# Patient Record
Sex: MALE | Race: WHITE | NOT HISPANIC OR LATINO | URBAN - METROPOLITAN AREA
[De-identification: names, ages, dates, MRNs, and addresses within clinical notes are randomized per-mention and may not be internally consistent; named-entity substitution may affect disease eponyms.]

---

## 2021-02-10 ENCOUNTER — EMERGENCY (EMERGENCY)
Facility: HOSPITAL | Age: 56
LOS: 1 days | Discharge: HOME | End: 2021-02-10
Attending: STUDENT IN AN ORGANIZED HEALTH CARE EDUCATION/TRAINING PROGRAM | Admitting: STUDENT IN AN ORGANIZED HEALTH CARE EDUCATION/TRAINING PROGRAM
Payer: COMMERCIAL

## 2021-02-10 VITALS
HEIGHT: 65 IN | HEART RATE: 107 BPM | OXYGEN SATURATION: 94 % | TEMPERATURE: 98 F | WEIGHT: 179.02 LBS | DIASTOLIC BLOOD PRESSURE: 118 MMHG | RESPIRATION RATE: 17 BRPM | SYSTOLIC BLOOD PRESSURE: 162 MMHG

## 2021-02-10 VITALS
HEART RATE: 88 BPM | OXYGEN SATURATION: 96 % | SYSTOLIC BLOOD PRESSURE: 177 MMHG | RESPIRATION RATE: 16 BRPM | TEMPERATURE: 98 F | DIASTOLIC BLOOD PRESSURE: 100 MMHG

## 2021-02-10 LAB
ALBUMIN SERPL ELPH-MCNC: 4 G/DL — SIGNIFICANT CHANGE UP (ref 3.3–5)
ALP SERPL-CCNC: 84 U/L — SIGNIFICANT CHANGE UP (ref 40–120)
ALT FLD-CCNC: 86 U/L — HIGH (ref 12–78)
ANION GAP SERPL CALC-SCNC: 10 MMOL/L — SIGNIFICANT CHANGE UP (ref 5–17)
APTT BLD: 35.7 SEC — HIGH (ref 27.5–35.5)
AST SERPL-CCNC: 45 U/L — HIGH (ref 15–37)
BASOPHILS # BLD AUTO: 0.05 K/UL — SIGNIFICANT CHANGE UP (ref 0–0.2)
BASOPHILS NFR BLD AUTO: 0.9 % — SIGNIFICANT CHANGE UP (ref 0–2)
BILIRUB SERPL-MCNC: 0.3 MG/DL — SIGNIFICANT CHANGE UP (ref 0.2–1.2)
BLD GP AB SCN SERPL QL: SIGNIFICANT CHANGE UP
BUN SERPL-MCNC: 15 MG/DL — SIGNIFICANT CHANGE UP (ref 7–23)
CALCIUM SERPL-MCNC: 8.8 MG/DL — SIGNIFICANT CHANGE UP (ref 8.5–10.1)
CHLORIDE SERPL-SCNC: 106 MMOL/L — SIGNIFICANT CHANGE UP (ref 96–108)
CO2 SERPL-SCNC: 23 MMOL/L — SIGNIFICANT CHANGE UP (ref 22–31)
CREAT SERPL-MCNC: 1 MG/DL — SIGNIFICANT CHANGE UP (ref 0.5–1.3)
EOSINOPHIL # BLD AUTO: 0.1 K/UL — SIGNIFICANT CHANGE UP (ref 0–0.5)
EOSINOPHIL NFR BLD AUTO: 1.8 % — SIGNIFICANT CHANGE UP (ref 0–6)
GLUCOSE SERPL-MCNC: 111 MG/DL — HIGH (ref 70–99)
HCT VFR BLD CALC: 48.6 % — SIGNIFICANT CHANGE UP (ref 39–50)
HGB BLD-MCNC: 16.7 G/DL — SIGNIFICANT CHANGE UP (ref 13–17)
IMM GRANULOCYTES NFR BLD AUTO: 0.2 % — SIGNIFICANT CHANGE UP (ref 0–1.5)
INR BLD: 0.97 RATIO — SIGNIFICANT CHANGE UP (ref 0.88–1.16)
LYMPHOCYTES # BLD AUTO: 1.59 K/UL — SIGNIFICANT CHANGE UP (ref 1–3.3)
LYMPHOCYTES # BLD AUTO: 28.9 % — SIGNIFICANT CHANGE UP (ref 13–44)
MCHC RBC-ENTMCNC: 30.9 PG — SIGNIFICANT CHANGE UP (ref 27–34)
MCHC RBC-ENTMCNC: 34.4 GM/DL — SIGNIFICANT CHANGE UP (ref 32–36)
MCV RBC AUTO: 89.8 FL — SIGNIFICANT CHANGE UP (ref 80–100)
MONOCYTES # BLD AUTO: 0.64 K/UL — SIGNIFICANT CHANGE UP (ref 0–0.9)
MONOCYTES NFR BLD AUTO: 11.6 % — SIGNIFICANT CHANGE UP (ref 2–14)
NEUTROPHILS # BLD AUTO: 3.11 K/UL — SIGNIFICANT CHANGE UP (ref 1.8–7.4)
NEUTROPHILS NFR BLD AUTO: 56.6 % — SIGNIFICANT CHANGE UP (ref 43–77)
NRBC # BLD: 0 /100 WBCS — SIGNIFICANT CHANGE UP (ref 0–0)
PLATELET # BLD AUTO: 282 K/UL — SIGNIFICANT CHANGE UP (ref 150–400)
POTASSIUM SERPL-MCNC: 3.7 MMOL/L — SIGNIFICANT CHANGE UP (ref 3.5–5.3)
POTASSIUM SERPL-SCNC: 3.7 MMOL/L — SIGNIFICANT CHANGE UP (ref 3.5–5.3)
PROT SERPL-MCNC: 8.2 G/DL — SIGNIFICANT CHANGE UP (ref 6–8.3)
PROTHROM AB SERPL-ACNC: 11.4 SEC — SIGNIFICANT CHANGE UP (ref 10.6–13.6)
RBC # BLD: 5.41 M/UL — SIGNIFICANT CHANGE UP (ref 4.2–5.8)
RBC # FLD: 11.8 % — SIGNIFICANT CHANGE UP (ref 10.3–14.5)
SODIUM SERPL-SCNC: 139 MMOL/L — SIGNIFICANT CHANGE UP (ref 135–145)
WBC # BLD: 5.5 K/UL — SIGNIFICANT CHANGE UP (ref 3.8–10.5)
WBC # FLD AUTO: 5.5 K/UL — SIGNIFICANT CHANGE UP (ref 3.8–10.5)

## 2021-02-10 PROCEDURE — 72125 CT NECK SPINE W/O DYE: CPT

## 2021-02-10 PROCEDURE — 36415 COLL VENOUS BLD VENIPUNCTURE: CPT

## 2021-02-10 PROCEDURE — 72125 CT NECK SPINE W/O DYE: CPT | Mod: 26,MA

## 2021-02-10 PROCEDURE — 86900 BLOOD TYPING SEROLOGIC ABO: CPT

## 2021-02-10 PROCEDURE — 73110 X-RAY EXAM OF WRIST: CPT

## 2021-02-10 PROCEDURE — 71045 X-RAY EXAM CHEST 1 VIEW: CPT

## 2021-02-10 PROCEDURE — 86901 BLOOD TYPING SEROLOGIC RH(D): CPT

## 2021-02-10 PROCEDURE — 73110 X-RAY EXAM OF WRIST: CPT | Mod: 26,LT

## 2021-02-10 PROCEDURE — 70450 CT HEAD/BRAIN W/O DYE: CPT

## 2021-02-10 PROCEDURE — 85025 COMPLETE CBC W/AUTO DIFF WBC: CPT

## 2021-02-10 PROCEDURE — 74177 CT ABD & PELVIS W/CONTRAST: CPT

## 2021-02-10 PROCEDURE — 25622 CLTX CARPL SCPHD FX W/O MNPJ: CPT | Mod: 54

## 2021-02-10 PROCEDURE — 74177 CT ABD & PELVIS W/CONTRAST: CPT | Mod: 26,MA

## 2021-02-10 PROCEDURE — 99285 EMERGENCY DEPT VISIT HI MDM: CPT | Mod: 57

## 2021-02-10 PROCEDURE — 99284 EMERGENCY DEPT VISIT MOD MDM: CPT | Mod: 25

## 2021-02-10 PROCEDURE — 70450 CT HEAD/BRAIN W/O DYE: CPT | Mod: 26,MA

## 2021-02-10 PROCEDURE — 71045 X-RAY EXAM CHEST 1 VIEW: CPT | Mod: 26

## 2021-02-10 PROCEDURE — 85730 THROMBOPLASTIN TIME PARTIAL: CPT

## 2021-02-10 PROCEDURE — 29125 APPL SHORT ARM SPLINT STATIC: CPT | Mod: LT

## 2021-02-10 PROCEDURE — 80053 COMPREHEN METABOLIC PANEL: CPT

## 2021-02-10 PROCEDURE — 86850 RBC ANTIBODY SCREEN: CPT

## 2021-02-10 PROCEDURE — 85610 PROTHROMBIN TIME: CPT

## 2021-02-10 RX ORDER — ACETAMINOPHEN 500 MG
975 TABLET ORAL ONCE
Refills: 0 | Status: COMPLETED | OUTPATIENT
Start: 2021-02-10 | End: 2021-02-10

## 2021-02-10 RX ADMIN — Medication 975 MILLIGRAM(S): at 12:46

## 2021-02-10 NOTE — ED PROVIDER NOTE - NS ED ROS FT
Constitutional: No fever.  Neurological: +headache.  Eyes: No vision changes.   Ears, Nose, Mouth, Throat: No congestion.  Cardiovascular: No chest pain.  Respiratory: No difficulty breathing.  Gastrointestinal: No nausea or vomiting. + abdominal pain.  Genitourinary: No dysuria.  Musculoskeletal: + wrist pain.  Integumentary (skin and/or breast): No rash.

## 2021-02-10 NOTE — ED PROVIDER NOTE - CARE PROVIDER_API CALL
Claudia García)  Orthopaedic Surgery Surgery  [address] [place] [address]  Phone: [phone]  Fax: [phone]  Follow Up Time: 4-6 Days

## 2021-02-10 NOTE — ED PROVIDER NOTE - PHYSICAL EXAMINATION
Vital signs as available reviewed.  General:  Comfortable, no acute distress.  Head: Mild erythema and  tenderness to palpation behind left ear without crepitus / step off.  Eyes:  Conjunctiva pink, no icterus.  Cardiovascular:  Regular rate, no obvious murmur.  Respiratory:  Clear to auscultation, good air entry bilaterally.  Abdomen:  Soft, + left mid abdominal pain.  Musculoskeletal:  left wrist swelling and erythema with snuff box tenderness.  Neurologic: Alert and oriented, moving all extremities.  Skin:  Warm and dry.

## 2021-02-10 NOTE — ED PROVIDER NOTE - CARE PLAN
Principal Discharge DX:	Scaphoid fracture, wrist, closed  Secondary Diagnosis:	Closed head injury  Secondary Diagnosis:	Fall

## 2021-02-10 NOTE — ED PROVIDER NOTE - OBJECTIVE STATEMENT
trip and fall, hit back of head, fell on left wrist. hit stomach on metal cabinet. + headache, + abdominal pain + left wrist pain.    left wrist swelling and erythema, tenderness to palpation behing left ear. 55 M history of brain aneurysm here s/p trip and fall at work. He report he fell and hit the back of his head, and tried to catch himself with left arm and also hit stomach on metal cabinet. + headache, + abdominal pain + left wrist pain. no loss of consciousness. no vomiting.

## 2021-02-10 NOTE — ED PROVIDER NOTE - PATIENT PORTAL LINK FT
You can access the FollowMyHealth Patient Portal offered by [facility] by registering at the following website: [email or web address]. By joining [facility]’s FollowMyHealth portal, you will also be able to view your health information using other applications (apps) compatible with our system.

## 2021-02-10 NOTE — ED PROVIDER NOTE - NSFOLLOWUPINSTRUCTIONS_ED_ALL_ED_FT
Wrist Injury    WHAT YOU NEED TO KNOW:    A wrist injury is damage to the tissues of your wrist joint. Examples are a fracture, sprain (stretched or torn ligament), or strain (stretched or torn tendon).    DISCHARGE INSTRUCTIONS:    Return to the emergency department if:   •The skin on or near your wrist or hand feels cold or turns blue or white.      •The skin on or near your wrist or hand is tight, raised, and swollen.      •You have new trouble moving and using your hands, fingers, or wrist.      •Your wrist, hands, or fingers become swollen, red, numb, or tingle.      •You have any open wounds that are red, swollen, warm, or have pus coming from them.      Call your doctor if:   •You have a fever.      •The bruising, swelling, or pain in your wrist gets worse.      •You have questions or concerns about your condition or care.      Medicines: You may need any of the following:   •NSAIDs, such as ibuprofen, help decrease swelling, pain, and fever. NSAIDs can cause stomach bleeding or kidney problems in certain people. If you take blood thinner medicine, always ask your healthcare provider if NSAIDs are safe for you. Always read the medicine label and follow directions.      •Prescription pain medicine may be given. Ask your healthcare provider how to take this medicine safely. Some prescription pain medicines contain acetaminophen. Do not take other medicines that contain acetaminophen without talking to your healthcare provider. Too much acetaminophen may cause liver damage. Prescription pain medicine may cause constipation. Ask your healthcare provider how to prevent or treat constipation.       •Take your medicine as directed. Contact your healthcare provider if you think your medicine is not helping or if you have side effects. Tell him or her if you are allergic to any medicine. Keep a list of the medicines, vitamins, and herbs you take. Include the amounts, and when and why you take them. Bring the list or the pill bottles to follow-up visits. Carry your medicine list with you in case of an emergency.      Manage your symptoms:   •Rest your wrist for 48 hours, or as directed. Avoid activities that cause pain. Ask which activities you should avoid and for how long. Ask when you may return to your regular physical activities or sports. If you start to use your wrist too soon, you may injure it wrist again.      •Put ice on your wrist to decrease pain and swelling. Use an ice pack, or put crushed ice in a plastic bag. Wrap a towel around the bag before you put it on your wrist. Apply ice for 15 minutes every hour, or as directed.      •Apply compression by wrapping your wrist with an elastic bandage. This will help decrease swelling, support your wrist, and help it heal. Wear your wrist wrap as directed. The bandage should be snug but not so tight that your fingers are numb or tingly.      •Elevate your wrist above the level of your heart when you sit or lie down. Prop your arm and hand on pillows to keep your wrist elevated comfortably.      •Go to physical therapy, if directed. A physical therapist can teach you exercises to strengthen your wrist and improve the range of movement. These exercises may also help decrease your pain.      Prevent another wrist injury:   •Do strengthening exercises. Your healthcare provider or physical therapist may suggest that you do exercises to strengthen your hand and arm muscles. He or she will tell you when to start doing these exercises and how long to continue.      •Protect your wrists. Wrist guard splints or protective tape can help support your wrist during exercise and sports. These devices may also keep your wrist from bending too far back. Ask for more information about the type of wrist support that you should use.      Follow up with your doctor as directed: Write down your questions so you remember to ask them during your visits

## 2021-02-10 NOTE — ED ADULT NURSE NOTE - OBJECTIVE STATEMENT
55 male alert and orientate to ed c/o slip and fall at states abdominal, head and wrist pain.  V/S WNL   PEERL   neg resp comp  L/S Clear x 3   neg abd   neg neuro   no LOC  pt c/o 7/10 pain attended to by Dr Gomez     Tylenol 925 mg po given.  pt to CT and X ray

## 2021-02-10 NOTE — ED ADULT NURSE NOTE - PAIN RATING/NUMBER SCALE (0-10): ACTIVITY
Quality 226: Preventive Care And Screening: Tobacco Use: Screening And Cessation Intervention: Patient screened for tobacco use and is an ex/non-smoker 7

## 2021-02-10 NOTE — ED PROVIDER NOTE - NS ED MD DISPO DISCHARGE CCDA
Patient/Caregiver provided printed discharge information. I, Bib Ashley, performed a face to face bedside interview with this patient regarding history of present illness, review of symptoms and relevant past medical, social and family history.  I completed an independent physical examination. I have communicated the patient’s plan of care and disposition with the ACP.          32 yo M Pt, presents complaining of L ear pain x 2 days. PT states he developed ear pain that hurts when he touches his ear. PT admits to swimming 2 days ago. PT also tried to clean out ears with peroxide and no help; pe left ear pain with tugging on ear;  mild swelling of external canal;   dx otitis externa tx otitic abx and pain meds

## 2022-08-01 PROBLEM — Z00.00 ENCOUNTER FOR PREVENTIVE HEALTH EXAMINATION: Status: ACTIVE | Noted: 2022-08-01

## 2022-08-01 PROBLEM — I72.9 ANEURYSM OF UNSPECIFIED SITE: Chronic | Status: ACTIVE | Noted: 2021-02-10

## 2022-08-01 PROBLEM — F41.9 ANXIETY DISORDER, UNSPECIFIED: Chronic | Status: ACTIVE | Noted: 2021-02-10

## 2022-08-04 ENCOUNTER — APPOINTMENT (OUTPATIENT)
Dept: ORTHOPEDIC SURGERY | Facility: CLINIC | Age: 57
End: 2022-08-04
Payer: OTHER MISCELLANEOUS

## 2022-08-04 DIAGNOSIS — S73.102A UNSPECIFIED SPRAIN OF LEFT HIP, INITIAL ENCOUNTER: ICD-10-CM

## 2022-08-04 PROCEDURE — 73503 X-RAY EXAM HIP UNI 4/> VIEWS: CPT | Mod: LT

## 2022-08-04 PROCEDURE — 99072 ADDL SUPL MATRL&STAF TM PHE: CPT

## 2022-08-04 PROCEDURE — 99203 OFFICE O/P NEW LOW 30 MIN: CPT

## 2022-08-04 RX ORDER — METHYLPREDNISOLONE 4 MG/1
4 TABLET ORAL
Qty: 1 | Refills: 1 | Status: ACTIVE | COMMUNITY
Start: 2022-08-04 | End: 1900-01-01

## 2022-08-04 NOTE — PHYSICAL EXAM
[Normal Mood and Affect] : normal mood and affect [Able to Communicate] : able to communicate [Well Developed] : well developed [Well Nourished] : well nourished [NL (120)] : flexion 120 degrees [NL (35)] : adduction 35 degrees [NL (45)] : external rotation 45 degrees [5___] : abduction 5[unfilled]/5 [Left] : left hip with pelvis [AP] : anteroposterior [Lateral] : lateral [Moderate arthritis (Tonnis Grade 2)] : Moderate arthritis (Tonnis Grade 2) [] : no pain with log-rolling of hip [FreeTextEntry9] : Moderate OA lt hip and mild OA rt hip [TWNoteComboBox6] : internal rotation 30 degrees

## 2022-08-04 NOTE — WORK
[Sprain/Strain] : sprain/strain [Was the competent medical cause of the injury] : was the competent medical cause of the injury [Are consistent with the injury] : are consistent with the injury [Consistent with my objective findings] : consistent with my objective findings [Total] : total [Does not reveal pre-existing condition(s) that may affect treatment/prognosis] : does not reveal pre-existing condition(s) that may affect treatment/prognosis [Cannot return to work because ________] : cannot return to work because [unfilled] [FreeTextEntry1] : Uncertain

## 2022-08-04 NOTE — HISTORY OF PRESENT ILLNESS
[de-identified] : WC Case  \par \par 8/4/22   Initial visit for this 57 year male who getting out of a c ar at work on 8/2/22 when slipped and almost did a split, injuring lt hip. C/o lt groin pain since. Limping. Difficulty wtbearing. Taking no nsaids. OOW since then.\par \par PMH: had a similar injury x 3 months ago lifting heavy fence material when he first noticed lt groin pain. Slowly improved wher he could walk w/o pain but couldn't run. [FreeTextEntry1] : right elbow

## 2022-08-05 ENCOUNTER — APPOINTMENT (OUTPATIENT)
Dept: ORTHOPEDIC SURGERY | Facility: CLINIC | Age: 57
End: 2022-08-05

## 2022-08-05 VITALS — BODY MASS INDEX: 30.82 KG/M2 | HEIGHT: 65 IN | WEIGHT: 185 LBS

## 2022-08-05 DIAGNOSIS — M77.01 MEDIAL EPICONDYLITIS, RIGHT ELBOW: ICD-10-CM

## 2022-08-05 PROCEDURE — 73070 X-RAY EXAM OF ELBOW: CPT | Mod: RT

## 2022-08-05 PROCEDURE — 99214 OFFICE O/P EST MOD 30 MIN: CPT | Mod: 25

## 2022-08-05 PROCEDURE — 20551 NJX 1 TENDON ORIGIN/INSJ: CPT

## 2022-08-05 NOTE — HISTORY OF PRESENT ILLNESS
[8] : 8 [6] : 6 [Constant] : constant [Rest] : rest [Full time] : Work status: full time [de-identified] : 8/5/22  Return visit for this 57 year male RHD who lifted a heavy object x 4 weeks ago and noticed pain rt elbow since. Constant and daily. Worse lifting and gripping. taking no nsaids.\par \par PMH: No prior issues [] : no [FreeTextEntry1] : right elbow [FreeTextEntry9] : advil

## 2022-08-05 NOTE — PHYSICAL EXAM
[Normal Mood and Affect] : normal mood and affect [Orientated] : orientated [Able to Communicate] : able to communicate [Well Developed] : well developed [Well Nourished] : well nourished [NL (150)] : flexion 150 degrees [NL (0)] : extension 0 degrees [NL (90)] : supination 90 degrees [5___] : supination 5[unfilled]/5 [] : no tenderness over lateral epicondyle [Right] : right elbow [There are no fractures, subluxations or dislocations. No significant abnormalities are seen] : There are no fractures, subluxations or dislocations. No significant abnormalities are seen [FreeTextEntry1] : ?? slight DJD proximal radio-ulnar joint

## 2022-08-05 NOTE — PROCEDURE
[Tendon Origin] : tendon origin [Right] : of the right [Medial epicondyle] : medial epicondyle [Pain] : pain [Inflammation] : inflammation [Alcohol] : alcohol [___ cc    1%] : Lidocaine ~Vcc of 1%  [___ cc    40mg] : Methylprednisolone (Depomedrol) ~Vcc of 40 mg  [] : Patient tolerated procedure well [Call if redness, pain or fever occur] : call if redness, pain or fever occur [Apply ice for 15min out of every hour for the next 12-24 hours as tolerated] : apply ice for 15 minutes out of every hour for the next 12-24 hours as tolerated [Risks, benefits, alternatives discussed / Verbal consent obtained] : the risks benefits, and alternatives have been discussed, and verbal consent was obtained

## 2022-08-22 ENCOUNTER — APPOINTMENT (OUTPATIENT)
Dept: ORTHOPEDIC SURGERY | Facility: CLINIC | Age: 57
End: 2022-08-22

## 2022-08-22 PROCEDURE — 99072 ADDL SUPL MATRL&STAF TM PHE: CPT

## 2022-08-22 PROCEDURE — 99213 OFFICE O/P EST LOW 20 MIN: CPT

## 2022-08-22 RX ORDER — PIROXICAM 20 MG/1
20 CAPSULE ORAL
Qty: 30 | Refills: 5 | Status: ACTIVE | COMMUNITY
Start: 2022-08-22 | End: 1900-01-01

## 2022-08-22 NOTE — REVIEW OF SYSTEMS
[Joint Pain] : joint pain [Joint Stiffness] : joint stiffness [Negative] : Heme/Lymph [FreeTextEntry5] : brain aneurysm

## 2022-08-22 NOTE — PHYSICAL EXAM
[Normal Mood and Affect] : normal mood and affect [Orientated] : orientated [Able to Communicate] : able to communicate [Well Developed] : well developed [Well Nourished] : well nourished [Right] : right elbow [There are no fractures, subluxations or dislocations. No significant abnormalities are seen] : There are no fractures, subluxations or dislocations. No significant abnormalities are seen [NL (120)] : flexion 120 degrees [NL (35)] : adduction 35 degrees [NL (45)] : external rotation 45 degrees [5___] : abduction 5[unfilled]/5 [Left] : left hip with pelvis [AP] : anteroposterior [Lateral] : lateral [Moderate arthritis (Tonnis Grade 2)] : Moderate arthritis (Tonnis Grade 2) [FreeTextEntry1] : ?? slight DJD proximal radio-ulnar joint [] : no pain with log-rolling of hip [FreeTextEntry9] : Moderate OA lt hip and mild OA rt hip [TWNoteComboBox6] : internal rotation 30 degrees

## 2022-08-22 NOTE — HISTORY OF PRESENT ILLNESS
[Left Leg] : left leg [Work related] : work related [Gradual] : gradual [Sudden] : sudden [6] : 6 [5] : 5 [Burning] : burning [Shooting] : shooting [Stabbing] : stabbing [Intermittent] : intermittent [Exercising] : exercising [de-identified] : WC 08/02/22\par \par 08/22/22:   F/U Returns today 20 days after injury at work. Still c/o persitent lt hip pain despite taking a MDP x 2. Still uses a cane prn.\par \par 8/5/22  Return visit for this 57 year male RHD who lifted a heavy object x 4 weeks ago and noticed pain rt elbow since. Constant and daily. Worse lifting and gripping. taking no nsaids.\par \par PMH: No prior issues\par \par 8/4/22 Initial visit for this 57 year male who getting out of a car at work on 8/2/22 when slipped and almost did a split, injuring lt hip. C/o lt groin pain since. Limping. Difficulty wtbearing. Taking no nsaids. OOW since then.\par \par PMH: had a similar injury x 3 months ago lifting heavy fence material when he first noticed lt groin pain. Slowly improved wher he could walk w/o pain but couldn't run.  [] : Post Surgical Visit: no [FreeTextEntry1] : left hip  [de-identified] : xrays  [de-identified] : None

## 2022-09-07 ENCOUNTER — APPOINTMENT (OUTPATIENT)
Dept: PAIN MANAGEMENT | Facility: CLINIC | Age: 57
End: 2022-09-07

## 2022-09-07 VITALS — BODY MASS INDEX: 30.82 KG/M2 | WEIGHT: 185 LBS | HEIGHT: 65 IN

## 2022-09-07 DIAGNOSIS — Z87.891 PERSONAL HISTORY OF NICOTINE DEPENDENCE: ICD-10-CM

## 2022-09-07 DIAGNOSIS — Z78.9 OTHER SPECIFIED HEALTH STATUS: ICD-10-CM

## 2022-09-07 DIAGNOSIS — Z86.59 PERSONAL HISTORY OF OTHER MENTAL AND BEHAVIORAL DISORDERS: ICD-10-CM

## 2022-09-07 PROCEDURE — 99072 ADDL SUPL MATRL&STAF TM PHE: CPT

## 2022-09-07 PROCEDURE — 99204 OFFICE O/P NEW MOD 45 MIN: CPT

## 2022-09-07 NOTE — HISTORY OF PRESENT ILLNESS
[Work related] : work related [8] : 8 [6] : 6 [Dull/Aching] : dull/aching [Sharp] : sharp [Constant] : constant [Household chores] : household chores [Leisure] : leisure [Work] : work [Sleep] : sleep [Rest] : rest [Meds] : meds [Standing] : standing [] : no [FreeTextEntry1] : left hip [FreeTextEntry3] : 8/2/22

## 2022-09-07 NOTE — PHYSICAL EXAM
[Left] : left hip [4___] : flexion 4[unfilled]/5 [] : groin pain with external rotation [TWNoteComboBox7] : flexion 100 degrees [de-identified] : external rotation 20 degrees [TWNoteComboBox6] : internal rotation 20 degrees

## 2022-09-07 NOTE — ASSESSMENT
[FreeTextEntry1] : After discussing various treatment options with the patient including but not limited to oral medications, physical therapy, exercise, modalities as well as interventional spinal injections, we have decided with the following plan:\par \par 1) Intervention Injection Therapy:\par I personally reviewed the MRI/CT scan images and agree with the radiologist's report. The radiological findings were discussed with the patient.\par The risks, benefits, contents and alternatives to injection were explained in full to the patient. Risks outlined include but are not limited to infection,sepsis, bleeding, post-dural puncture headache, nerve damage, temporary increase in pain, syncopal episode, failure to resolve symptoms, allergic reaction, symptom recurrence, and elevation of blood sugar in diabetics. Cortisone may cause immunosuppression. Patient understands the risks. All questions were answered. After discussion of options, patient requested an injection. Information regarding the injection was given to the patient. Which medications to stop prior to the injection was explained to the patient as well.\par \par Follow up in 1-2 weeks post injection for re-evaluation. \par Continue Home exercises, stretching, activity modification, physical therapy, and conservative care.\par \par Left hip injection\par \par

## 2022-09-19 ENCOUNTER — APPOINTMENT (OUTPATIENT)
Dept: ORTHOPEDIC SURGERY | Facility: CLINIC | Age: 57
End: 2022-09-19

## 2022-09-19 VITALS — BODY MASS INDEX: 30.16 KG/M2 | WEIGHT: 181 LBS | HEIGHT: 65 IN

## 2022-09-19 PROCEDURE — 99072 ADDL SUPL MATRL&STAF TM PHE: CPT

## 2022-09-19 PROCEDURE — 99213 OFFICE O/P EST LOW 20 MIN: CPT

## 2022-09-19 NOTE — HISTORY OF PRESENT ILLNESS
[Work related] : work related [8] : 8 [6] : 6 [Dull/Aching] : dull/aching [Stabbing] : stabbing [Not working due to injury] : Work status: not working due to injury [de-identified] :  08/02/22\par \par 09/16/22:  WC F/U.  Is nearly 7 weeks after injury at work. Still c/o lt groin pain. Has pain both walking and at rest. Occ. limp. Saw pain management who agrred with intra articular cortisone injection. Awaiting authorization. Still OOW,totally disabled. Taking Feldene daily.\par \par 08/22/22:  WC F/U Returns today 20 days after injury at work. Still c/o persistent lt hip pain despite taking a MDP x 2. Still uses a cane prn.\par \par 8/5/22  Return visit for this 57 year male RHD who lifted a heavy object x 4 weeks ago and noticed pain rt elbow since. Constant and daily. Worse lifting and gripping. taking no nsaids.\par \par PMH: No prior issues\par \par 8/4/22 Initial visit for this 57 year male who getting out of a car at work on 8/2/22 when slipped and almost did a split, injuring lt hip. C/o lt groin pain since. Limping. Difficulty wtbearing. Taking no nsaids. OOW since then.\par \par PMH: had a similar injury x 3 months ago lifting heavy fence material when he first noticed lt groin pain. Slowly improved wher he could walk w/o pain but couldn't run.  [] : no [FreeTextEntry1] : left hip [de-identified] : c [de-identified] : julia TAN dept

## 2022-09-26 ENCOUNTER — FORM ENCOUNTER (OUTPATIENT)
Age: 57
End: 2022-09-26

## 2022-10-04 ENCOUNTER — APPOINTMENT (OUTPATIENT)
Dept: PAIN MANAGEMENT | Facility: CLINIC | Age: 57
End: 2022-10-04

## 2022-10-04 PROCEDURE — 99072 ADDL SUPL MATRL&STAF TM PHE: CPT

## 2022-10-04 PROCEDURE — J3490M: CUSTOM

## 2022-10-04 PROCEDURE — 27093 INJECTION FOR HIP X-RAY: CPT | Mod: LT

## 2022-10-04 PROCEDURE — 73525 CONTRAST X-RAY OF HIP: CPT | Mod: 26,59

## 2022-10-04 NOTE — PROCEDURE
[FreeTextEntry3] : Date of Service: 10/04/2022 \par \par Account: [id number]\par \par Patient: CINTIA WILHELM \par \par YOB: 1965\par \par Age: 57 year\par \par \par Surgeon: Josie Matias M.D.\par \par Pre-Operative Diagnosis: Unilateral Primary Osteoarthritis , Left Hip (M16.12)\par \par Post Operative Diagnosis: Unilateral Primary Osteoarthritis , Left Hip (M16.12)\par \par Procedure: Left Hip arthrogram and steroid injection under fluoroscopic  guidance\par \par \par This procedure was carried out using fluoroscopic guidance.  The risks and benefits of the procedure were discussed extensively with the patient.  The consent of the patient was obtained and the following procedure was performed.\par \par The patient was placed in the supine position with left hip flexed and externally rotated 25 degrees.  The area of the left groin was prepped and draped in a sterile fashion.  The fluoroscopic image intensifier was then positioned so that the left hip appeared in view, and the midline intertrochanteric region was identified and marked. The skin and subcutaneous structures were then anesthetized using 1 cc of 1% lidocaine.  A 22 gauge spinal needle was then inserted and directed into this left hip intra-capsular region.  After negative aspiration for heme and CSF,  3 cc of Omnipaque was injected and appeared to fill the joint  margins.\par \par Left hip arthrogram showed no intravascular flow, and good spread around the femoral head and to the acetabulum. 5 cc drained of serous fluid. An injectate of 3cc 0.25% Marcaine plus 80 mg of Kenalog Medrol was then injected into the left hip space.\par \par The needle was then removed and pressure was applied. The patient was instructed to apply ice over the injection site for twenty minutes every two hours for the next 24 to 48 hours.  The patient was also instructed to contact me immediately if there were any problems.\par \par Josie Matias M.D.

## 2022-10-17 ENCOUNTER — APPOINTMENT (OUTPATIENT)
Dept: ORTHOPEDIC SURGERY | Facility: CLINIC | Age: 57
End: 2022-10-17

## 2022-10-17 PROCEDURE — 99072 ADDL SUPL MATRL&STAF TM PHE: CPT

## 2022-10-17 PROCEDURE — 99213 OFFICE O/P EST LOW 20 MIN: CPT

## 2022-10-17 RX ORDER — CELECOXIB 200 MG/1
200 CAPSULE ORAL DAILY
Qty: 30 | Refills: 5 | Status: ACTIVE | COMMUNITY
Start: 2022-10-17 | End: 1900-01-01

## 2022-10-17 NOTE — HISTORY OF PRESENT ILLNESS
[Left Leg] : left leg [Work related] : work related [Gradual] : gradual [Sudden] : sudden [6] : 6 [5] : 5 [Burning] : burning [Shooting] : shooting [Stabbing] : stabbing [Intermittent] : intermittent [Rest] : rest [Exercising] : exercising [de-identified] :  08/02/22\par \par 10/17/22:   F/U.  Nearly 11 weeks after injury at work.Still c/o lt groin pain since the injury. Had no relief after an intra-articular injection lt hip x 1-2 weeks ago. Taking Feldene daily. Still OOW, totally disabled.  Is in PT twice a week.\par \par 09/16/22:   F/U.  Is nearly 7 weeks after injury at work. Still c/o lt groin pain. Has pain both walking and at rest. Occ. limp. Saw pain management who agrred with intra articular cortisone injection. Awaiting authorization. Still OOW,totally disabled. Taking Feldene daily.\par \par 08/22/22:  WC F/U Returns today 20 days after injury at work. Still c/o persistent lt hip pain despite taking a MDP x 2. Still uses a cane prn.\par \par 8/5/22  Return visit for this 57 year male RHD who lifted a heavy object x 4 weeks ago and noticed pain rt elbow since. Constant and daily. Worse lifting and gripping. taking no nsaids.\par \par PMH: No prior issues\par \par 8/4/22 Initial visit for this 57 year male who getting out of a car at work on 8/2/22 when slipped and almost did a split, injuring lt hip. C/o lt groin pain since. Limping. Difficulty wtbearing. Taking no nsaids. OOW since then.\par \par PMH: had a similar injury x 3 months ago lifting heavy fence material when he first noticed lt groin pain. Slowly improved wher he could walk w/o pain but couldn't run.  [] : Post Surgical Visit: no [FreeTextEntry1] : left hip  [de-identified] : xrays  [de-identified] : none

## 2022-10-24 ENCOUNTER — APPOINTMENT (OUTPATIENT)
Dept: PAIN MANAGEMENT | Facility: CLINIC | Age: 57
End: 2022-10-24

## 2022-11-14 ENCOUNTER — APPOINTMENT (OUTPATIENT)
Dept: ORTHOPEDIC SURGERY | Facility: CLINIC | Age: 57
End: 2022-11-14

## 2022-11-14 VITALS — HEIGHT: 65 IN | WEIGHT: 180 LBS | BODY MASS INDEX: 29.99 KG/M2

## 2022-11-14 DIAGNOSIS — S73.102D UNSPECIFIED SPRAIN OF LEFT HIP, SUBSEQUENT ENCOUNTER: ICD-10-CM

## 2022-11-14 PROCEDURE — 99213 OFFICE O/P EST LOW 20 MIN: CPT

## 2022-11-14 PROCEDURE — 99072 ADDL SUPL MATRL&STAF TM PHE: CPT

## 2022-11-14 NOTE — REVIEW OF SYSTEMS
[Joint Pain] : joint pain [Joint Stiffness] : joint stiffness [Negative] : Heme/Lymph [Depression] : depression [FreeTextEntry5] : brain aneurysm [de-identified] : aneurysm brain

## 2022-11-14 NOTE — HISTORY OF PRESENT ILLNESS
[Left Leg] : left leg [Work related] : work related [Gradual] : gradual [Sudden] : sudden [6] : 6 [5] : 5 [Burning] : burning [Shooting] : shooting [Stabbing] : stabbing [Intermittent] : intermittent [Rest] : rest [Exercising] : exercising [Meds] : meds [Sitting] : sitting [Walking] : walking [Stairs] : stairs [Not working due to injury] : Work status: not working due to injury [de-identified] :  08/02/22\par \par 11/14/22  WC F/U Now 15 weeks s/p injury at work. Still c/o persistent lt groin pain. Limping. Had an intra-articular cortisone injection lt hip x 3--4 weeks w/o relief. Still OOW, totally disabled. had to stop Celebrex due to diarrhea. Stopped PT x 10 days ago due to pain.\par \par 10/17/22:  WC F/U.  Nearly 11 weeks after injury at work.Still c/o lt groin pain since the injury. Had no relief after an intra-articular injection lt hip x 1-2 weeks ago. Taking Feldene daily. Still OOW, totally disabled.  Is in PT twice a week.\par \par 09/16/22:  WC F/U.  Is nearly 7 weeks after injury at work. Still c/o lt groin pain. Has pain both walking and at rest. Occ. limp. Saw pain management who agrred with intra articular cortisone injection. Awaiting authorization. Still OOW,totally disabled. Taking Feldene daily.\par \par 08/22/22:  WC F/U Returns today 20 days after injury at work. Still c/o persistent lt hip pain despite taking a MDP x 2. Still uses a cane prn.\par \par 8/5/22  Return visit for this 57 year male RHD who lifted a heavy object x 4 weeks ago and noticed pain rt elbow since. Constant and daily. Worse lifting and gripping. taking no nsaids.\par \par PMH: No prior issues\par \par 8/4/22 Initial visit for this 57 year male who getting out of a car at work on 8/2/22 when slipped and almost did a split, injuring lt hip. C/o lt groin pain since. Limping. Difficulty wtbearing. Taking no nsaids. OOW since then.\par \par PMH: had a similar injury x 3 months ago lifting heavy fence material when he first noticed lt groin pain. Slowly improved wher he could walk w/o pain but couldn't run.  [] : Post Surgical Visit: no [FreeTextEntry1] : left hip  [FreeTextEntry3] : 8/2/22 [de-identified] : 10/17/22 [de-identified] : 11/22/22 [de-identified] : xrays  [de-identified] : PT

## 2022-11-21 ENCOUNTER — APPOINTMENT (OUTPATIENT)
Dept: PAIN MANAGEMENT | Facility: CLINIC | Age: 57
End: 2022-11-21

## 2022-11-21 VITALS — HEIGHT: 65 IN | BODY MASS INDEX: 29.99 KG/M2 | WEIGHT: 180 LBS

## 2022-11-21 PROCEDURE — 99213 OFFICE O/P EST LOW 20 MIN: CPT

## 2022-11-21 PROCEDURE — 99072 ADDL SUPL MATRL&STAF TM PHE: CPT

## 2022-11-21 NOTE — HISTORY OF PRESENT ILLNESS
[Work related] : work related [6] : 6 [Dull/Aching] : dull/aching [Sharp] : sharp [Constant] : constant [Household chores] : household chores [Leisure] : leisure [Work] : work [Sleep] : sleep [Rest] : rest [Meds] : meds [Standing] : standing [9] : 9 No [] : no [FreeTextEntry1] : left hip [FreeTextEntry3] : 8/2/22

## 2022-11-21 NOTE — PHYSICAL EXAM
[Left] : left hip [4___] : flexion 4[unfilled]/5 [] : no palpable mass [TWNoteComboBox7] : flexion 100 degrees [de-identified] : external rotation 20 degrees [TWNoteComboBox6] : internal rotation 20 degrees

## 2022-11-27 ENCOUNTER — FORM ENCOUNTER (OUTPATIENT)
Age: 57
End: 2022-11-27

## 2022-11-28 ENCOUNTER — APPOINTMENT (OUTPATIENT)
Dept: MRI IMAGING | Facility: CLINIC | Age: 57
End: 2022-11-28

## 2022-11-28 PROCEDURE — 73721 MRI JNT OF LWR EXTRE W/O DYE: CPT | Mod: LT

## 2022-11-28 PROCEDURE — 99072 ADDL SUPL MATRL&STAF TM PHE: CPT

## 2022-12-02 ENCOUNTER — APPOINTMENT (OUTPATIENT)
Dept: ORTHOPEDIC SURGERY | Facility: CLINIC | Age: 57
End: 2022-12-02

## 2022-12-02 PROCEDURE — 99215 OFFICE O/P EST HI 40 MIN: CPT

## 2022-12-02 PROCEDURE — 99072 ADDL SUPL MATRL&STAF TM PHE: CPT

## 2022-12-02 NOTE — HISTORY OF PRESENT ILLNESS
[Work related] : work related [7] : 7 [6] : 6 [Dull/Aching] : dull/aching [Localized] : localized [Sharp] : sharp [Intermittent] : intermittent [Nothing helps with pain getting better] : Nothing helps with pain getting better [Walking] : walking [Not working due to injury] : Work status: not working due to injury [de-identified] : WC 8/2/22:\par \par 12/02/22 PT PRESENTS HERE TODAY WITH LEFT HIP PAIN AFTER GETTING HURT AT WORK\par PT HAS TRY AN INJECTION DONE BY DR RYAN WITH NO RELIEF  [] : no [FreeTextEntry1] : LEFT HIP  [FreeTextEntry3] : 08/02/22 [FreeTextEntry5] : WORK INJURY  [de-identified] : DR RYAN/SANTIAGO  [de-identified] : X RAYS AND MRI DONE AT [facility] [de-identified] : INJECITON

## 2022-12-02 NOTE — ASSESSMENT
[FreeTextEntry1] : 57 year M WITH MODERATE LT HIP PAIN. HAS SEEN DR. HENDERSON IN THE PAST, HAS TRIED MDP, STOPPED PT AFTER 10 SESSIONS DUE TO PAIN. HAD IA HIP INJECTION UNDER FLUORO WITH GOOD RELIEF. PAIN IS IN THE GROIN AND DOES NOT RADIATE. PAIN WORSENS WITH WALKING PROLONGED DISTANCES. PAIN IS AFFECTING ADL AND FUNCTIONAL ACTIVITIES; PUTTING ON SHOES AND SOCKS. XRAYS FROM 8/4/22 REVIEWED WITH ADVANCED OA. MRI FROM 11/28/22 WAS REVIEWED. TREATMENT OPTIONS REVIEWED. LT SCOT DISCUSSED AT LENGTH. QUESTIONS ANSWERED. HE IS OOW TD. \par \par H/O SPINAL FUSION. \par NO METAL ALLERGIES\par NO H/O DM\par NO H/O DVT/PE\par NO H/O MRSA/VRSA \par \par HAD BAD REACTION TO CELEBREX IN THE PAST. \par \par \par LT SCOT - DUAL MOBILITY. \par \par The patient was advised of the diagnosis. The natural history of the pathology was  explained in full to the patient in layman's terms. All questions were answered. The risks\par and benefits of surgical and non-surgical treatment alternatives were explained in full the patient. \par \par We discussed my findings and the natural history of their condition. We talked about the details of the proposed surgery and the recovery. We discussed the material risks, possible benefits and alternatives to surgery. The risks include but are not limited to infection, bleeding and possible need for blood transfusion, fracture, bowel blockage, bladder retention or infection, need for reoperation, stiffness and/or limited range of motion, possible damage to nerves and blood vessels, failure of fixation of components, risk of deep vein thromboses and pulmonary embolism, wound healing problems, dislocation, and possible leg length discrepancy. Although incredibly rare, we also discussed the risks of a cardiac event, stroke and even death during, or following, the surgery. We discussed the type of implants the patient will be receiving and the type of fixation that will be used, as well as whether a robot or computer navigation aide will be used. The patient understands they will need medical clearance and will attend a preoperative joint education class. We also discussed the type of anesthesia they will receive, and the risks associated with hospital or rehab length of stay, obesity, diabetes and smoking.\par \par Patient Complains of pain in the affected joint with a level that often reaches greater than a 8/10. The Pain has been progressively worsening of his/her treatment coarse. The pain has interfered with their ADLs and worsens with weight bearing. On exam they often have episodes of swelling/effusion with limited ROM. Pain worsens with ROM passive and active and I can palpate crepitus. \par \par X- rays were reviewed with the patient and they show joint space narrowing, subchondral sclerosis, osteophyte formation, and subchondral cysts.\par After a period of more than 12 weeks physical therapy or exercise program done with me or another treating physician they have continued pain. The patient has failed a trial of NSAID medication or pain relieves if they were unable to tolerate NSAID medications as well as a series of injection, steroid or Hyaluronic Acid. After a long discussion with the patient both the patient and I have decided we have exhausted all forms of less radical treatments and they would like to proceed with Total Joint Replacement.

## 2022-12-02 NOTE — PHYSICAL EXAM
[Left] : left hip [2+] : posterior tibialis pulse: 2+ [] : patient ambulates without assistive device

## 2022-12-12 ENCOUNTER — APPOINTMENT (OUTPATIENT)
Dept: PAIN MANAGEMENT | Facility: CLINIC | Age: 57
End: 2022-12-12

## 2022-12-27 ENCOUNTER — APPOINTMENT (OUTPATIENT)
Dept: ORTHOPEDIC SURGERY | Facility: CLINIC | Age: 57
End: 2022-12-27

## 2022-12-30 ENCOUNTER — APPOINTMENT (OUTPATIENT)
Dept: ORTHOPEDIC SURGERY | Facility: CLINIC | Age: 57
End: 2022-12-30
Payer: OTHER MISCELLANEOUS

## 2022-12-30 DIAGNOSIS — M16.12 UNILATERAL PRIMARY OSTEOARTHRITIS, LEFT HIP: ICD-10-CM

## 2022-12-30 PROCEDURE — 99214 OFFICE O/P EST MOD 30 MIN: CPT

## 2022-12-30 PROCEDURE — 99072 ADDL SUPL MATRL&STAF TM PHE: CPT

## 2022-12-30 NOTE — HISTORY OF PRESENT ILLNESS
[Left Leg] : left leg [Work related] : work related [Gradual] : gradual [Sudden] : sudden [7] : 7 [6] : 6 [Dull/Aching] : dull/aching [Localized] : localized [Sharp] : sharp [Intermittent] : intermittent [Rest] : rest [Nothing helps with pain getting better] : Nothing helps with pain getting better [Walking] : walking [Exercising] : exercising [Not working due to injury] : Work status: not working due to injury [de-identified] : WC 8/2/22:\par \par 12/02/22 PT PRESENTS HERE TODAY WITH LEFT HIP PAIN AFTER GETTING HURT AT WORK\par PT HAS TRY AN INJECTION DONE BY DR RYAN WITH NO RELIEF  [] : Post Surgical Visit: no [FreeTextEntry1] : LEFT HIP  [FreeTextEntry3] : 08/02/22 [FreeTextEntry5] : WORK INJURY  [de-identified] : DR RYAN/SANTIAGO  [de-identified] : X RAYS AND MRI DONE AT [facility] [de-identified] : INJECITON

## 2023-01-17 ENCOUNTER — FORM ENCOUNTER (OUTPATIENT)
Age: 58
End: 2023-01-17

## 2023-01-23 ENCOUNTER — FORM ENCOUNTER (OUTPATIENT)
Age: 58
End: 2023-01-23

## 2023-02-02 ENCOUNTER — APPOINTMENT (OUTPATIENT)
Dept: ORTHOPEDIC SURGERY | Facility: CLINIC | Age: 58
End: 2023-02-02

## 2023-02-10 ENCOUNTER — OUTPATIENT (OUTPATIENT)
Dept: OUTPATIENT SERVICES | Facility: HOSPITAL | Age: 58
LOS: 1 days | End: 2023-02-10
Payer: COMMERCIAL

## 2023-02-10 VITALS
HEIGHT: 65 IN | RESPIRATION RATE: 12 BRPM | DIASTOLIC BLOOD PRESSURE: 80 MMHG | HEART RATE: 70 BPM | TEMPERATURE: 97 F | SYSTOLIC BLOOD PRESSURE: 122 MMHG | WEIGHT: 179.02 LBS

## 2023-02-10 DIAGNOSIS — Z01.818 ENCOUNTER FOR OTHER PREPROCEDURAL EXAMINATION: ICD-10-CM

## 2023-02-10 DIAGNOSIS — Z98.890 OTHER SPECIFIED POSTPROCEDURAL STATES: Chronic | ICD-10-CM

## 2023-02-10 DIAGNOSIS — M17.12 UNILATERAL PRIMARY OSTEOARTHRITIS, LEFT KNEE: ICD-10-CM

## 2023-02-10 DIAGNOSIS — M16.12 UNILATERAL PRIMARY OSTEOARTHRITIS, LEFT HIP: ICD-10-CM

## 2023-02-10 DIAGNOSIS — Z98.1 ARTHRODESIS STATUS: Chronic | ICD-10-CM

## 2023-02-10 LAB
A1C WITH ESTIMATED AVERAGE GLUCOSE RESULT: 5 % — SIGNIFICANT CHANGE UP (ref 4–5.6)
ALBUMIN SERPL ELPH-MCNC: 3.7 G/DL — SIGNIFICANT CHANGE UP (ref 3.3–5)
ALP SERPL-CCNC: 76 U/L — SIGNIFICANT CHANGE UP (ref 30–120)
ALT FLD-CCNC: 35 U/L DA — SIGNIFICANT CHANGE UP (ref 10–60)
ANION GAP SERPL CALC-SCNC: 9 MMOL/L — SIGNIFICANT CHANGE UP (ref 5–17)
APTT BLD: 36 SEC — HIGH (ref 27.5–35.5)
AST SERPL-CCNC: 28 U/L — SIGNIFICANT CHANGE UP (ref 10–40)
BILIRUB SERPL-MCNC: 0.3 MG/DL — SIGNIFICANT CHANGE UP (ref 0.2–1.2)
BLD GP AB SCN SERPL QL: SIGNIFICANT CHANGE UP
BUN SERPL-MCNC: 15 MG/DL — SIGNIFICANT CHANGE UP (ref 7–23)
CALCIUM SERPL-MCNC: 8.9 MG/DL — SIGNIFICANT CHANGE UP (ref 8.4–10.5)
CHLORIDE SERPL-SCNC: 103 MMOL/L — SIGNIFICANT CHANGE UP (ref 96–108)
CO2 SERPL-SCNC: 25 MMOL/L — SIGNIFICANT CHANGE UP (ref 22–31)
CREAT SERPL-MCNC: 0.82 MG/DL — SIGNIFICANT CHANGE UP (ref 0.5–1.3)
EGFR: 102 ML/MIN/1.73M2 — SIGNIFICANT CHANGE UP
ESTIMATED AVERAGE GLUCOSE: 97 MG/DL — SIGNIFICANT CHANGE UP (ref 68–114)
GLUCOSE SERPL-MCNC: 114 MG/DL — HIGH (ref 70–99)
HCT VFR BLD CALC: 44.6 % — SIGNIFICANT CHANGE UP (ref 39–50)
HGB BLD-MCNC: 14.2 G/DL — SIGNIFICANT CHANGE UP (ref 13–17)
INR BLD: 1 RATIO — SIGNIFICANT CHANGE UP (ref 0.88–1.16)
MCHC RBC-ENTMCNC: 28.6 PG — SIGNIFICANT CHANGE UP (ref 27–34)
MCHC RBC-ENTMCNC: 31.8 GM/DL — LOW (ref 32–36)
MCV RBC AUTO: 89.9 FL — SIGNIFICANT CHANGE UP (ref 80–100)
MRSA PCR RESULT.: SIGNIFICANT CHANGE UP
NRBC # BLD: 0 /100 WBCS — SIGNIFICANT CHANGE UP (ref 0–0)
PLATELET # BLD AUTO: 289 K/UL — SIGNIFICANT CHANGE UP (ref 150–400)
POTASSIUM SERPL-MCNC: 4.5 MMOL/L — SIGNIFICANT CHANGE UP (ref 3.5–5.3)
POTASSIUM SERPL-SCNC: 4.5 MMOL/L — SIGNIFICANT CHANGE UP (ref 3.5–5.3)
PROT SERPL-MCNC: 7.5 G/DL — SIGNIFICANT CHANGE UP (ref 6–8.3)
PROTHROM AB SERPL-ACNC: 11.8 SEC — SIGNIFICANT CHANGE UP (ref 10.5–13.4)
RBC # BLD: 4.96 M/UL — SIGNIFICANT CHANGE UP (ref 4.2–5.8)
RBC # FLD: 12.4 % — SIGNIFICANT CHANGE UP (ref 10.3–14.5)
S AUREUS DNA NOSE QL NAA+PROBE: SIGNIFICANT CHANGE UP
SODIUM SERPL-SCNC: 137 MMOL/L — SIGNIFICANT CHANGE UP (ref 135–145)
WBC # BLD: 6.05 K/UL — SIGNIFICANT CHANGE UP (ref 3.8–10.5)
WBC # FLD AUTO: 6.05 K/UL — SIGNIFICANT CHANGE UP (ref 3.8–10.5)

## 2023-02-10 PROCEDURE — 93005 ELECTROCARDIOGRAM TRACING: CPT

## 2023-02-10 PROCEDURE — 93010 ELECTROCARDIOGRAM REPORT: CPT

## 2023-02-10 PROCEDURE — G0463: CPT

## 2023-02-10 RX ORDER — AMLODIPINE BESYLATE 2.5 MG/1
0 TABLET ORAL
Qty: 0 | Refills: 0 | DISCHARGE

## 2023-02-10 RX ORDER — DULOXETINE HYDROCHLORIDE 30 MG/1
0 CAPSULE, DELAYED RELEASE ORAL
Qty: 0 | Refills: 0 | DISCHARGE

## 2023-02-10 NOTE — H&P PST ADULT - PROBLEM SELECTOR PLAN 1
Left total hip replacement is planned for 2/27/2023  Covid testing is scheduled for 2/24/2023 @ [facility]  Diagnostic testing performed  medical and neurology clearances requested  Pre op instructions were reviewed including mupirocin, chlorhexadine and gatorade protocols  best wishes offered

## 2023-02-10 NOTE — H&P PST ADULT - NSICDXPASTMEDICALHX_GEN_ALL_CORE_FT
PAST MEDICAL HISTORY:  Aneurysm     Anxiety     COVID-19 vaccine series completed     Hypertension     Osteoarthritis of left knee

## 2023-02-10 NOTE — H&P PST ADULT - NSANTHOSAYNRD_GEN_A_CORE
No. ARIC screening performed.  STOP BANG Legend: 0-2 = LOW Risk; 3-4 = INTERMEDIATE Risk; 5-8 = HIGH Risk

## 2023-02-10 NOTE — H&P PST ADULT - NSICDXPASTSURGICALHX_GEN_ALL_CORE_FT
PAST SURGICAL HISTORY:  History of arthroscopy of knee     History of arthroscopy of shoulder     History of lumbar fusion     History of surgery on wrist

## 2023-02-10 NOTE — H&P PST ADULT - NSICDXFAMILYHX_GEN_ALL_CORE_FT
FAMILY HISTORY:  Father  Still living? No  FHx: heart disease, Age at diagnosis: Age Unknown    Mother  Still living? No  Family history of COPD (chronic obstructive pulmonary disease), Age at diagnosis: Age Unknown    Sibling  Still living? No  Family history of colon cancer, Age at diagnosis: Age Unknown

## 2023-02-10 NOTE — H&P PST ADULT - HISTORY OF PRESENT ILLNESS
58 yo male is scheduled for left total hip arthroplasty on 2/27/2023 @ [facility].   56 yo male is scheduled for left total hip arthroplasty on 2/27/2023 @ [facility].    He reports injury to left hip 8/2022 when he was exiting his car.    His pain radiates from left hip to groin and left medial thigh. Pain is constant and rates 8/10 at worst.

## 2023-02-14 LAB — APTT BLD: 34.7 SEC — SIGNIFICANT CHANGE UP (ref 27.5–35.5)

## 2023-02-24 ENCOUNTER — OUTPATIENT (OUTPATIENT)
Dept: OUTPATIENT SERVICES | Facility: HOSPITAL | Age: 58
LOS: 1 days | End: 2023-02-24
Payer: COMMERCIAL

## 2023-02-24 DIAGNOSIS — M16.12 UNILATERAL PRIMARY OSTEOARTHRITIS, LEFT HIP: ICD-10-CM

## 2023-02-24 DIAGNOSIS — Z98.890 OTHER SPECIFIED POSTPROCEDURAL STATES: Chronic | ICD-10-CM

## 2023-02-24 DIAGNOSIS — Z01.818 ENCOUNTER FOR OTHER PREPROCEDURAL EXAMINATION: ICD-10-CM

## 2023-02-24 DIAGNOSIS — Z20.828 CONTACT WITH AND (SUSPECTED) EXPOSURE TO OTHER VIRAL COMMUNICABLE DISEASES: ICD-10-CM

## 2023-02-24 DIAGNOSIS — Z98.1 ARTHRODESIS STATUS: Chronic | ICD-10-CM

## 2023-02-24 LAB — SARS-COV-2 RNA SPEC QL NAA+PROBE: SIGNIFICANT CHANGE UP

## 2023-02-24 PROCEDURE — U0005: CPT

## 2023-02-24 PROCEDURE — U0003: CPT

## 2023-02-24 NOTE — PATIENT PROFILE ADULT - ARE SIGNIFICANT INDICATORS COMPLETE.
Called pt.  Advised pt to f/u with pcp for further recommendations.   appt made.  Advised pt, for any severe pain, shortness of breath, if chest pain gets worse, she is to go to ER right away for evaluation.  Patient verbalizes understanding and is agreement with treatment plan, no further questions at this time.       No Yes

## 2023-02-26 ENCOUNTER — TRANSCRIPTION ENCOUNTER (OUTPATIENT)
Age: 58
End: 2023-02-26

## 2023-02-27 ENCOUNTER — APPOINTMENT (OUTPATIENT)
Dept: ORTHOPEDIC SURGERY | Facility: HOSPITAL | Age: 58
End: 2023-02-27
Payer: OTHER MISCELLANEOUS

## 2023-02-27 ENCOUNTER — TRANSCRIPTION ENCOUNTER (OUTPATIENT)
Age: 58
End: 2023-02-27

## 2023-02-27 ENCOUNTER — INPATIENT (INPATIENT)
Facility: HOSPITAL | Age: 58
LOS: 0 days | Discharge: HOME | DRG: 470 | End: 2023-02-28
Attending: ORTHOPAEDIC SURGERY | Admitting: ORTHOPAEDIC SURGERY
Payer: COMMERCIAL

## 2023-02-27 VITALS
TEMPERATURE: 98 F | WEIGHT: 182.76 LBS | HEART RATE: 69 BPM | HEIGHT: 65 IN | OXYGEN SATURATION: 98 % | RESPIRATION RATE: 20 BRPM | SYSTOLIC BLOOD PRESSURE: 134 MMHG | DIASTOLIC BLOOD PRESSURE: 87 MMHG

## 2023-02-27 DIAGNOSIS — M16.12 UNILATERAL PRIMARY OSTEOARTHRITIS, LEFT HIP: ICD-10-CM

## 2023-02-27 DIAGNOSIS — Z98.1 ARTHRODESIS STATUS: Chronic | ICD-10-CM

## 2023-02-27 DIAGNOSIS — Z98.890 OTHER SPECIFIED POSTPROCEDURAL STATES: Chronic | ICD-10-CM

## 2023-02-27 DIAGNOSIS — I10 ESSENTIAL (PRIMARY) HYPERTENSION: ICD-10-CM

## 2023-02-27 DIAGNOSIS — F41.9 ANXIETY DISORDER, UNSPECIFIED: ICD-10-CM

## 2023-02-27 PROCEDURE — 27130 TOTAL HIP ARTHROPLASTY: CPT | Mod: AS,LT

## 2023-02-27 PROCEDURE — 99223 1ST HOSP IP/OBS HIGH 75: CPT

## 2023-02-27 PROCEDURE — 20610 DRAIN/INJ JOINT/BURSA W/O US: CPT | Mod: 59,LT

## 2023-02-27 PROCEDURE — 73501 X-RAY EXAM HIP UNI 1 VIEW: CPT | Mod: 26,LT

## 2023-02-27 PROCEDURE — 73502 X-RAY EXAM HIP UNI 2-3 VIEWS: CPT | Mod: 26

## 2023-02-27 PROCEDURE — 27130 TOTAL HIP ARTHROPLASTY: CPT | Mod: LT

## 2023-02-27 PROCEDURE — 20985 CPTR-ASST DIR MS PX: CPT

## 2023-02-27 DEVICE — SHELL ACET R3 STD NO HOLE 58MM: Type: IMPLANTABLE DEVICE | Site: LEFT | Status: FUNCTIONAL

## 2023-02-27 DEVICE — INSERT ACET OR3O DUAL MOBILITY 28/44: Type: IMPLANTABLE DEVICE | Site: LEFT | Status: FUNCTIONAL

## 2023-02-27 DEVICE — FEM HD TAPER OXINIUM 28MM: Type: IMPLANTABLE DEVICE | Site: LEFT | Status: FUNCTIONAL

## 2023-02-27 DEVICE — LINER ACET OR3O DM XLPE 44/58: Type: IMPLANTABLE DEVICE | Site: LEFT | Status: FUNCTIONAL

## 2023-02-27 DEVICE — POLARSTEM FEM COLLAR LAT TI HA 3: Type: IMPLANTABLE DEVICE | Site: LEFT | Status: FUNCTIONAL

## 2023-02-27 DEVICE — SCREW PELVIC G2 STD 116MM: Type: IMPLANTABLE DEVICE | Site: LEFT | Status: FUNCTIONAL

## 2023-02-27 RX ORDER — ASPIRIN/CALCIUM CARB/MAGNESIUM 324 MG
1 TABLET ORAL
Qty: 56 | Refills: 0
Start: 2023-02-27 | End: 2023-03-26

## 2023-02-27 RX ORDER — TRANEXAMIC ACID 100 MG/ML
1000 INJECTION, SOLUTION INTRAVENOUS ONCE
Refills: 0 | Status: COMPLETED | OUTPATIENT
Start: 2023-02-27 | End: 2023-02-27

## 2023-02-27 RX ORDER — HYDROMORPHONE HYDROCHLORIDE 2 MG/ML
1 INJECTION INTRAMUSCULAR; INTRAVENOUS; SUBCUTANEOUS
Refills: 0 | Status: DISCONTINUED | OUTPATIENT
Start: 2023-02-27 | End: 2023-02-27

## 2023-02-27 RX ORDER — AMLODIPINE BESYLATE 2.5 MG/1
5 TABLET ORAL DAILY
Refills: 0 | Status: DISCONTINUED | OUTPATIENT
Start: 2023-03-01 | End: 2023-02-28

## 2023-02-27 RX ORDER — SODIUM CHLORIDE 9 MG/ML
1000 INJECTION, SOLUTION INTRAVENOUS
Refills: 0 | Status: DISCONTINUED | OUTPATIENT
Start: 2023-02-27 | End: 2023-02-28

## 2023-02-27 RX ORDER — ONDANSETRON 8 MG/1
4 TABLET, FILM COATED ORAL EVERY 6 HOURS
Refills: 0 | Status: DISCONTINUED | OUTPATIENT
Start: 2023-02-27 | End: 2023-02-28

## 2023-02-27 RX ORDER — OMEPRAZOLE 10 MG/1
1 CAPSULE, DELAYED RELEASE ORAL
Qty: 28 | Refills: 0
Start: 2023-02-27 | End: 2023-03-26

## 2023-02-27 RX ORDER — HYDROMORPHONE HYDROCHLORIDE 2 MG/ML
0.5 INJECTION INTRAMUSCULAR; INTRAVENOUS; SUBCUTANEOUS
Refills: 0 | Status: DISCONTINUED | OUTPATIENT
Start: 2023-02-27 | End: 2023-02-27

## 2023-02-27 RX ORDER — OXYCODONE HYDROCHLORIDE 5 MG/1
10 TABLET ORAL
Refills: 0 | Status: DISCONTINUED | OUTPATIENT
Start: 2023-02-27 | End: 2023-02-28

## 2023-02-27 RX ORDER — DULOXETINE HYDROCHLORIDE 30 MG/1
20 CAPSULE, DELAYED RELEASE ORAL DAILY
Refills: 0 | Status: DISCONTINUED | OUTPATIENT
Start: 2023-02-27 | End: 2023-02-28

## 2023-02-27 RX ORDER — ACETAMINOPHEN 500 MG
1000 TABLET ORAL EVERY 8 HOURS
Refills: 0 | Status: DISCONTINUED | OUTPATIENT
Start: 2023-02-27 | End: 2023-02-28

## 2023-02-27 RX ORDER — ACETAMINOPHEN 500 MG
1000 TABLET ORAL ONCE
Refills: 0 | Status: COMPLETED | OUTPATIENT
Start: 2023-02-27 | End: 2023-02-27

## 2023-02-27 RX ORDER — CHLORHEXIDINE GLUCONATE 213 G/1000ML
1 SOLUTION TOPICAL ONCE
Refills: 0 | Status: COMPLETED | OUTPATIENT
Start: 2023-02-27 | End: 2023-02-27

## 2023-02-27 RX ORDER — DEXAMETHASONE 0.5 MG/5ML
8 ELIXIR ORAL ONCE
Refills: 0 | Status: COMPLETED | OUTPATIENT
Start: 2023-02-28 | End: 2023-02-28

## 2023-02-27 RX ORDER — SENNA PLUS 8.6 MG/1
2 TABLET ORAL AT BEDTIME
Refills: 0 | Status: DISCONTINUED | OUTPATIENT
Start: 2023-02-27 | End: 2023-02-28

## 2023-02-27 RX ORDER — APREPITANT 80 MG/1
40 CAPSULE ORAL ONCE
Refills: 0 | Status: COMPLETED | OUTPATIENT
Start: 2023-02-27 | End: 2023-02-27

## 2023-02-27 RX ORDER — ONDANSETRON 8 MG/1
4 TABLET, FILM COATED ORAL ONCE
Refills: 0 | Status: DISCONTINUED | OUTPATIENT
Start: 2023-02-27 | End: 2023-02-27

## 2023-02-27 RX ORDER — OXYCODONE HYDROCHLORIDE 5 MG/1
5 TABLET ORAL
Refills: 0 | Status: DISCONTINUED | OUTPATIENT
Start: 2023-02-27 | End: 2023-02-28

## 2023-02-27 RX ORDER — CEFAZOLIN SODIUM 1 G
2000 VIAL (EA) INJECTION EVERY 8 HOURS
Refills: 0 | Status: COMPLETED | OUTPATIENT
Start: 2023-02-27 | End: 2023-02-27

## 2023-02-27 RX ORDER — CEFAZOLIN SODIUM 1 G
2000 VIAL (EA) INJECTION ONCE
Refills: 0 | Status: COMPLETED | OUTPATIENT
Start: 2023-02-27 | End: 2023-02-27

## 2023-02-27 RX ORDER — HYDROMORPHONE HYDROCHLORIDE 2 MG/ML
0.5 INJECTION INTRAMUSCULAR; INTRAVENOUS; SUBCUTANEOUS
Refills: 0 | Status: DISCONTINUED | OUTPATIENT
Start: 2023-02-27 | End: 2023-02-28

## 2023-02-27 RX ORDER — ASPIRIN/CALCIUM CARB/MAGNESIUM 324 MG
1 TABLET ORAL
Qty: 28 | Refills: 0
Start: 2023-02-27 | End: 2023-03-26

## 2023-02-27 RX ORDER — AMLODIPINE BESYLATE 2.5 MG/1
1 TABLET ORAL
Qty: 0 | Refills: 0 | DISCHARGE

## 2023-02-27 RX ORDER — POLYETHYLENE GLYCOL 3350 17 G/17G
17 POWDER, FOR SOLUTION ORAL AT BEDTIME
Refills: 0 | Status: DISCONTINUED | OUTPATIENT
Start: 2023-02-27 | End: 2023-02-28

## 2023-02-27 RX ORDER — SODIUM CHLORIDE 9 MG/ML
1000 INJECTION, SOLUTION INTRAVENOUS
Refills: 0 | Status: DISCONTINUED | OUTPATIENT
Start: 2023-02-27 | End: 2023-02-27

## 2023-02-27 RX ORDER — ACETAMINOPHEN 500 MG
2 TABLET ORAL
Qty: 0 | Refills: 0 | DISCHARGE
Start: 2023-02-27 | End: 2023-03-13

## 2023-02-27 RX ORDER — ASPIRIN/CALCIUM CARB/MAGNESIUM 324 MG
81 TABLET ORAL
Refills: 0 | Status: DISCONTINUED | OUTPATIENT
Start: 2023-02-28 | End: 2023-02-28

## 2023-02-27 RX ORDER — OXYCODONE HYDROCHLORIDE 5 MG/1
1 TABLET ORAL
Qty: 42 | Refills: 0
Start: 2023-02-27 | End: 2023-03-05

## 2023-02-27 RX ORDER — DULOXETINE HYDROCHLORIDE 30 MG/1
0 CAPSULE, DELAYED RELEASE ORAL
Qty: 0 | Refills: 0 | DISCHARGE

## 2023-02-27 RX ORDER — PANTOPRAZOLE SODIUM 20 MG/1
40 TABLET, DELAYED RELEASE ORAL
Refills: 0 | Status: DISCONTINUED | OUTPATIENT
Start: 2023-02-27 | End: 2023-02-28

## 2023-02-27 RX ORDER — MAGNESIUM HYDROXIDE 400 MG/1
30 TABLET, CHEWABLE ORAL DAILY
Refills: 0 | Status: DISCONTINUED | OUTPATIENT
Start: 2023-02-27 | End: 2023-02-28

## 2023-02-27 RX ORDER — CELECOXIB 200 MG/1
1 CAPSULE ORAL
Qty: 56 | Refills: 0
Start: 2023-02-27 | End: 2023-03-26

## 2023-02-27 RX ORDER — CELECOXIB 200 MG/1
200 CAPSULE ORAL EVERY 12 HOURS
Refills: 0 | Status: DISCONTINUED | OUTPATIENT
Start: 2023-02-27 | End: 2023-02-28

## 2023-02-27 RX ADMIN — CELECOXIB 200 MILLIGRAM(S): 200 CAPSULE ORAL at 21:13

## 2023-02-27 RX ADMIN — Medication 400 MILLIGRAM(S): at 14:25

## 2023-02-27 RX ADMIN — Medication 100 MILLIGRAM(S): at 23:57

## 2023-02-27 RX ADMIN — Medication 1000 MILLIGRAM(S): at 21:23

## 2023-02-27 RX ADMIN — HYDROMORPHONE HYDROCHLORIDE 0.5 MILLIGRAM(S): 2 INJECTION INTRAMUSCULAR; INTRAVENOUS; SUBCUTANEOUS at 15:33

## 2023-02-27 RX ADMIN — SODIUM CHLORIDE 125 MILLILITER(S): 9 INJECTION, SOLUTION INTRAVENOUS at 21:07

## 2023-02-27 RX ADMIN — Medication 1000 MILLIGRAM(S): at 21:06

## 2023-02-27 RX ADMIN — SODIUM CHLORIDE 125 MILLILITER(S): 9 INJECTION, SOLUTION INTRAVENOUS at 13:08

## 2023-02-27 RX ADMIN — OXYCODONE HYDROCHLORIDE 10 MILLIGRAM(S): 5 TABLET ORAL at 19:39

## 2023-02-27 RX ADMIN — HYDROMORPHONE HYDROCHLORIDE 0.5 MILLIGRAM(S): 2 INJECTION INTRAMUSCULAR; INTRAVENOUS; SUBCUTANEOUS at 16:03

## 2023-02-27 RX ADMIN — OXYCODONE HYDROCHLORIDE 5 MILLIGRAM(S): 5 TABLET ORAL at 13:39

## 2023-02-27 RX ADMIN — APREPITANT 40 MILLIGRAM(S): 80 CAPSULE ORAL at 07:09

## 2023-02-27 RX ADMIN — Medication 1000 MILLIGRAM(S): at 15:54

## 2023-02-27 RX ADMIN — SODIUM CHLORIDE 125 MILLILITER(S): 9 INJECTION, SOLUTION INTRAVENOUS at 23:57

## 2023-02-27 RX ADMIN — CELECOXIB 200 MILLIGRAM(S): 200 CAPSULE ORAL at 21:06

## 2023-02-27 RX ADMIN — SENNA PLUS 2 TABLET(S): 8.6 TABLET ORAL at 21:06

## 2023-02-27 RX ADMIN — CHLORHEXIDINE GLUCONATE 1 APPLICATION(S): 213 SOLUTION TOPICAL at 07:10

## 2023-02-27 RX ADMIN — Medication 100 MILLIGRAM(S): at 15:31

## 2023-02-27 RX ADMIN — SODIUM CHLORIDE 75 MILLILITER(S): 9 INJECTION, SOLUTION INTRAVENOUS at 10:40

## 2023-02-27 RX ADMIN — OXYCODONE HYDROCHLORIDE 10 MILLIGRAM(S): 5 TABLET ORAL at 19:55

## 2023-02-27 RX ADMIN — POLYETHYLENE GLYCOL 3350 17 GRAM(S): 17 POWDER, FOR SOLUTION ORAL at 21:07

## 2023-02-27 RX ADMIN — OXYCODONE HYDROCHLORIDE 5 MILLIGRAM(S): 5 TABLET ORAL at 13:09

## 2023-02-27 NOTE — PHYSICAL THERAPY INITIAL EVALUATION ADULT - ADDITIONAL COMMENTS
Pt lives in house with 3  steps to enter with handrail, will stay on first floor. Owns no DME. Pt's significant other  will assist at home upon d/c. Stall shower.

## 2023-02-27 NOTE — DISCHARGE NOTE PROVIDER - NSDCFUSCHEDAPPT_GEN_ALL_CORE_FT
Jared Olivier  [facility]  [address]  Scheduled Appointment: 02/27/2023    Jared Olivier  [facility]  [address]  Scheduled Appointment: 03/14/2023     Jared Olivier  [facility]  [address] [facility]  Scheduled Appointment: 03/14/2023

## 2023-02-27 NOTE — DISCHARGE NOTE NURSING/CASE MANAGEMENT/SOCIAL WORK - NSDCPEFALRISK_GEN_ALL_CORE
For information on Fall & Injury Prevention, visit: [email or web address] OR  [email or web address] OR  https://www.cdc.gov/steadi/patient.html

## 2023-02-27 NOTE — DISCHARGE NOTE PROVIDER - NSDCCPCAREPLAN_GEN_ALL_CORE_FT
PRINCIPAL DISCHARGE DIAGNOSIS  Diagnosis: Primary osteoarthritis of left hip  Assessment and Plan of Treatment: Physical Therapy /Occupational Therapy  Total Hip Protocol   - Ambulation  - Transfers   - Stairs   - ADLs (activities of daily living)  Posterior Total Hip Replacement precautions for 4 weeks  - High hip chair for sitting  - No internal rotation,   - No crossing legs   - No sleeping on opposite sides  • Avoid sitting in low, soft chairs such as sofas and car seats. You should sit on a chair using firm pillows to raise the height of the seat.  • Make sure your bed level is high, so that you maintain proper leg positioning when sitting on the side, or getting in or out.  • When entering and traveling by car, sit in the front passenger seat. Make sure that the car seat is all the way back and semi-reclined before entering.  • Do not allow your knees to come together when sitting or lying in bed.   • Ice 20 minutes several times daily with at least a 20 minute break in between icing sessions  You have a LEXX dressing. You may shower. Disconnect LEXX battery prior to showering. Reconnect battery after showering and press orange button to resume LEXX power. Remove LEXX dressing on post-op day #7.  Keep incision clean. DO NOT APPLY ANYTHING to incision site (salves/ointments/creams).  May shower.  Do not scrub incision site. Pat dry after shower. Sutures/Prineo dressing will be removed at surgeon's office during first post-op appointment, 2 weeks after surgery.   Keep incision clean. DO NOT APPLY ANYTHING to incision site (salves/ointments/creams). Do not scrub incision site. Pat dry after shower.

## 2023-02-27 NOTE — BRIEF OPERATIVE NOTE - NSICDXBRIEFPREOP_GEN_ALL_CORE_FT
[Use of Plain Language] : use of plain language [Adequate] : adequate [None] : none PRE-OP DIAGNOSIS:  Osteoarthritis of left hip 27-Feb-2023 09:52:09  Rishabh Samuels

## 2023-02-27 NOTE — DISCHARGE NOTE PROVIDER - PROVIDER TOKENS
PROVIDER:[TOKEN:[[id number]],SCHEDULEDAPPT:[03/14/2023],SCHEDULEDAPPTTIME:[11:00 AM],ESTABLISHEDPATIENT:[T]]

## 2023-02-27 NOTE — PROGRESS NOTE ADULT - SUBJECTIVE AND OBJECTIVE BOX
Orthopaedic Post Op Note    Procedure: Left THR  Surgeon: Raul Olivier     57y Male comfortable, without complaints. Reported pain score = 0  Denies N/V, CP, SOB, numbness/tingling of extremities.    PE:  Vital Signs Last 24 Hrs  T(C): 36.4 (27 Feb 2023 11:56), Max: 36.6 (27 Feb 2023 06:46)  T(F): 97.5 (27 Feb 2023 11:56), Max: 97.9 (27 Feb 2023 06:46)  HR: 60 (27 Feb 2023 11:56) (54 - 69)  BP: 112/80 (27 Feb 2023 11:56) (102/72 - 134/87)  RR: 18 (27 Feb 2023 11:56) (12 - 20)  SpO2: 96% (27 Feb 2023 11:56) (95% - 100%)    Parameters below as of 27 Feb 2023 11:56  Patient On (Oxygen Delivery Method): room air      General: Pt alert and oriented   Left Hip LEXX Dressing: C/D/I, battery on  Bilateral LEs:  Motor:   5/5 dorsiflexion, plantarflexion, EHL  Sensation intact to LT  2+ DP Pulses  SCDs in place    A/P: 57y Male stable POD#0 s/p Right THR   -  Acetaminophen, Celebrex, Oxycodone, Dilaudid for Pain Control   - DVT ppx: Aspirin 81mg q 12 h   - Annia op IV abx: Ancef  - Celebrex for HO ppx  - total hip precautions  - PT, OT per protocol  - F/U AM Labs  DCP = home tomorrow pending PT, OT, medical clearance

## 2023-02-27 NOTE — PATIENT CHOICE NOTE. - NSPTCHOICESTATE_GEN_ALL_CORE

## 2023-02-27 NOTE — CARE COORDINATION ASSESSMENT. - PATIENT HAS HAD RECENT CHANGES IN:
no changes .  # Code status: DNR, DNI  # HCP/ Surrogate: DYAN SANCHEZ IS HCP @ [phone]  # Estimated prognosis: Weeks to months (< 6 months)  # Hospice eligible: YES- 2/2 debility (WC bound), likely vascular dementia 2/2 stroke, and other comorbid conditions such as aspiration pneumonia, respiratory failure, and heart failure    10/29  > Awaiting hospice consents  > Home with hospice plan of care

## 2023-02-27 NOTE — OCCUPATIONAL THERAPY INITIAL EVALUATION ADULT - ADDITIONAL COMMENTS
Lives in a private home 3 DINA +railing +stall bedroom/bathroom on 1st floor   Pt does not own DME/AE

## 2023-02-27 NOTE — CARE COORDINATION ASSESSMENT. - NSPASTMEDSURGHISTORY_GEN_ALL_CORE_FT
PAST MEDICAL & SURGICAL HISTORY:  Anxiety      Aneurysm      Hypertension      COVID-19 vaccine series completed      Osteoarthritis of left knee      History of surgery on wrist      History of lumbar fusion      History of arthroscopy of shoulder      History of arthroscopy of knee

## 2023-02-27 NOTE — CARE COORDINATION ASSESSMENT. - NSDCPLANSERVICES_GEN_ALL_CORE
Subjective   Chief Complaint   Patient presents with   • Gynecologic Exam     WWE     • Contraception     Refills on xulane patches       Milena Ibarra is a 28 y.o. year old  presenting to be seen for her annual exam.  Today she is needing refills on Xulane patches. Has been on them since July. Tries to suppress periods by using patches continuously but she often still has BTB. She denies any concerns with it sticking and wishes to continue.     Patient's last menstrual period was 2022 (approximate).     OB History        2    Para   2    Term                AB        Living   2       SAB        IAB        Ectopic        Molar        Multiple        Live Births                    Current birth control method: Xulane     She is sexually active.  In the past 12 months there has not been new sexual partners.  Condoms are not typically used.  She would not like to be screened for STD's at today's exam.     Past 6 month menstrual history:    Cycle Frequency: regular, predictable and consistent every 28 - 32 days   Menstrual cycle character: flow is typically normal   Cycle Duration: 6-7   Number of heavy days of flows: 0   Dysmenorrhea: mild   PMS: none   Intermenstrual bleeding present: no   Post-coital bleeding present: no     She exercises regularly: yes.  She wears her seat belt:yes.  She has concerns about domestic violence: no.  Last colonoscopy:   Last DEXA: Never  Last MMG: Never  Last pap: 2020  History of abnormal PAP: no    The following portions of the patient's history were reviewed and updated as appropriate:problem list, current medications, allergies, past family history, past medical history, past social history and past surgical history.    Social History    Tobacco Use      Smoking status: Current Every Day Smoker        Packs/day: 0.50        Years: 15.00        Pack years: 7.5        Types: Cigarettes      Smokeless tobacco: Never Used    Social History  "    Substance and Sexual Activity   Alcohol Use Yes    Comment: occasionally      Review of Systems   Constitutional: Negative.  Negative for chills and fever.   Respiratory: Negative.    Cardiovascular: Negative.    Gastrointestinal: Negative.  Negative for constipation and diarrhea.   Endocrine: Negative.    Genitourinary: Negative.  Negative for dyspareunia, menstrual problem, pelvic pain, vaginal bleeding, vaginal discharge and vaginal pain.   Skin: Negative.    Neurological: Negative for dizziness, syncope, light-headedness and headaches.   Psychiatric/Behavioral: Negative for suicidal ideas.        Working with PCP on anxiety and depression, insomnia         Objective   /72   Pulse 106   Ht 157.5 cm (62\")   Wt 71.5 kg (157 lb 9.6 oz)   LMP 08/22/2022 (Approximate)   Breastfeeding No   BMI 28.83 kg/m²     General:  well developed; well nourished  no acute distress   Skin:  No suspicious lesions seen   Thyroid: normal to inspection and palpation   Breasts:  Examined in supine position  Symmetric without masses or skin dimpling  Nipples normal without inversion, lesions or discharge  There are no palpable axillary nodes   Abdomen: soft, non-tender; no masses  no umbilical or inguinal hernias are present  no hepato-splenomegaly  Normal findings: bowel sounds normal   Cardiac: Heart sounds are normal.  Regular rate and rhythm without murmur, gallop or rub.   Resp: Normal expansion.  Clear to auscultation.  No rales, rhonchi, or wheezing.   Psych: alert,oriented, in NAD with a full range of affect, normal behavior and no psychotic features   Pelvis: Uterus:  Clinical staff was present for exam  External genitalia:  normal appearance of the external genitalia including Bartholin's and [place]'s glands.  :  urethral meatus normal;  Vaginal:  normal pink mucosa without prolapse or lesions and discharge present -  scant, cloudy, watery, no odor noted  Cervix:  normal appearance. ectropian present;  Uterus:  " normal size, shape and consistency  Adnexa:  normal bimanual exam of the adnexa.  Rectal:  digital rectal exam not performed; anus visually normal appearing.     Lab Review   No data reviewed    Imaging  No data reviewed       Diagnoses and all orders for this visit:    1. Encounter for gynecological examination with Papanicolaou smear of cervix (Primary)  -     Cancel: LIQUID-BASED PAP SMEAR, [facility] ([facility])  -     LIQUID-BASED PAP SMEAR, [facility] ([facility])    2. Vaginal discharge  -     LIQUID-BASED PAP SMEAR, [facility] ([facility])    3. Encounter for surveillance of transdermal patch hormonal contraceptive device  -     Xulane 150-35 MCG/24HR; Place 1 patch on the skin as directed by provider 1 (One) Time Per Week.  Dispense: 3 patch; Refill: 12    Pap results: I will send card in mail or call if abnormal. RTC annually for well woman exams.     SBE encouraged monthly.     STI testing declined concerns, but noting a little discharge and friability to the cervix we will rule out. I will call with results.     Continue using Xulane for contraceptive. Discussed risks of BTB and the need to potentially allow for a period occasionally. She voices understanding. 3 sample patches given.     Mental health is currently well managed. She is up to date with PCP visits.     This note was electronically signed.    Vanessa David, APRN  September 19, 2022   57y Male stable POD#0 s/p Right THR    Met with patient at bedside, role of  explained. Patient s/p PROCEDURES: lt total hip replacement 2/27/2023. Patient  identified his daughter as her caregivers and who will transport him home when Medical / Ortho / PT/OT clears patient for discharge. Patient was seen by Physical Therapist on 2/27/2023 who recommended HCPT anticipating DC 2/28/2023 patient is in his plan. Patient denies any prior other services or DME use. CM provided him with  DC folder with a list of Choices for Home Care Agencies. Insurance provisions, Expectations, HC guidelines and criteria reviewed with patient verbalized understanding/ Patient chose [facility]. Referral to be sent accordingly requesting SOC 3/1/2023 CM contact provided/ CM will remain available.

## 2023-02-27 NOTE — DISCHARGE NOTE PROVIDER - NSDCMRMEDTOKEN_GEN_ALL_CORE_FT
amLODIPine 5 mg oral tablet: 1 tab(s) orally once a day  DULoxetine 20 mg oral delayed release capsule: orally once a day   3 in 1 commode s/p total hip left:   acetaminophen 500 mg oral tablet: 2 tab(s) orally every 8 hours  amLODIPine 5 mg oral tablet: 1 tab(s) orally once a day  Aspirin Enteric Coated 81 mg oral delayed release tablet: 1 tab orally every 12 hours. Take aspirin 2 hours before Celebrex/Meloxicam.  CeleBREX 200 mg oral capsule: 1 cap orally every 12 hours. Take 2 hours after aspirin.  Dilaudid 2 mg oral tablet: 1 - 2 tab(s) orally every 4 hours, As Needed for pain MDD:6  DULoxetine 20 mg oral delayed release capsule: orally once a day  hip kit s/p total hip left:   omeprazole 20 mg oral delayed release capsule: 1 cap(s) orally once a day    3 in 1 commode s/p total hip left:   acetaminophen 500 mg oral tablet: 2 tab(s) orally every 8 hours  amLODIPine 5 mg oral tablet: 1 tab(s) orally once a day  Aspirin Enteric Coated 81 mg oral delayed release tablet: 1 tab orally every 12 hours. Take aspirin 2 hours before Celebrex/Meloxicam.  CeleBREX 200 mg oral capsule: 1 cap orally every 12 hours. Take 2 hours after aspirin.  Dilaudid 2 mg oral tablet: 1 tab(s) orally every 6 hours, As Needed MDD:6   DULoxetine 20 mg oral delayed release capsule: orally once a day  hip kit s/p total hip left:   omeprazole 20 mg oral delayed release capsule: 1 cap(s) orally once a day

## 2023-02-27 NOTE — DISCHARGE NOTE PROVIDER - NSDCCPTREATMENT_GEN_ALL_CORE_FT
PRINCIPAL PROCEDURE  Procedure: Left total hip arthroplasty  Findings and Treatment: Severe DJD left hip

## 2023-02-27 NOTE — DISCHARGE NOTE NURSING/CASE MANAGEMENT/SOCIAL WORK - PATIENT PORTAL LINK FT
You can access the FollowMyHealth Patient Portal offered by [facility] by registering at the following website: [email or web address]. By joining [facility]’s FollowMyHealth portal, you will also be able to view your health information using other applications (apps) compatible with our system.

## 2023-02-27 NOTE — DISCHARGE NOTE NURSING/CASE MANAGEMENT/SOCIAL WORK - NSSCNAMETXT_GEN_ALL_CORE
[facility] care agency [phone] will reach out to you within 24-72 hours of your discharge to schedule home care visit/eval appointment with you. Please call agency for any queries regarding home care services  soc 3/1/2023

## 2023-02-27 NOTE — PHYSICAL THERAPY INITIAL EVALUATION ADULT - GAIT TRAINING, PT EVAL
Pt will ambulate 150 feet with RW independently in 2-3 sessions . Pt will negotiate 10 steps with handrail and cane independently in 3-4 sessions

## 2023-02-27 NOTE — CARE COORDINATION ASSESSMENT. - NSCAREPROVIDERS_GEN_ALL_CORE_FT
CARE PROVIDERS:  Administration: Chanda Carmichael  Administration: Shannan Rinaldi  Admitting: Jared Olivier  Attending: Jared Olivier  Consultant: Harris Starr  Nurse: Karly Hicks  Nurse: Miranda Moreno  Nurse: Angela Gill  Nurse: Leona Manzano  Nurse: Hank Smiley  Nurse: Sanna Sol  Nurse: Kristal Alfredo  Nurse: Carrie Webster  Override: Carrie Webster  Physical Therapy: Lidia Garcia  Physical Therapy: Jeff Cm  Primary Team: Tran Ward  Primary Team: Yadira Fong  Primary Team: Laura Julien  Primary Team: Su Jeronimo  Primary Team: Rishabh Sameuls  Team: [facility] Hospitalists, Team  UR// Supp. Assoc.: Alberta Lundberg

## 2023-02-27 NOTE — OCCUPATIONAL THERAPY INITIAL EVALUATION ADULT - BED MOBILITY/TRANSFERS, PREVIOUS LEVEL OF FUNCTION, OT EVAL
Patient presents today with Type 2 diabetes.      Assessment:  Patient presents today for follow-up after starting the Dexcom G6 sensor.  Patient here to review the download.     Current treatment: Metformin 1000 mg BID.                                  Ozempic . 25 mg qweek.    Reviewed download on the Dexcom Clarity program.  Download shows:    Patient is at 86% time in range.  Average blood sugar is 145 mg/dl.    Patient has spent the last 13 days watching what his blood sugars are doing in response to what he was eating.      Education:    Reviewed the progression of diabetes.  Prepared the phone rony.  Patient states he has his daughter insert the second sensor.  It went well.    Patient then started the sensor.  Sensor placed: in his right outer arm.      Plan:  Patient will message us to review download when needed.  Will then review and let Dr. Centeno know.  Patient will call [facility] if having any technical issues.    Follow-up:    Time Spent: 60        
independent

## 2023-02-27 NOTE — DISCHARGE NOTE PROVIDER - CARE PROVIDER_API CALL
Jared Olivier)  Orthopaedic Surgery  [address] [place] [address]  Phone: [phone]  Fax: [phone]  Established Patient  Scheduled Appointment: 03/14/2023 11:00 AM

## 2023-02-27 NOTE — PHYSICAL THERAPY INITIAL EVALUATION ADULT - PERTINENT HX OF CURRENT PROBLEM, REHAB EVAL
58 yo male is scheduled for left total hip arthroplasty on 2/27/2023 @ [facility].    He reports injury to left hip 8/2022 when he was exiting his car.    His pain radiates from left hip to groin and left medial thigh. Pain is constant and rates 8/10 at worst.

## 2023-02-27 NOTE — OCCUPATIONAL THERAPY INITIAL EVALUATION ADULT - ORIENTATION, REHAB EVAL
Patient Education     Fever in Children    A fever is a natural reaction of the body to an illness, such as infections from viruses or bacteria. In most cases, the fever itself is not harmful. It actually helps the body fight infections. A fever does not need to be treated unless your child is uncomfortable and looks or acts sick. How your child looks and feels are often more important than the level of the fever.  If your child has a fever, check his or her temperature as needed. Don't use a glass thermometer that contains mercury. They can be dangerous if the glass breaks and the mercury spills out. Always use a digital thermometer when checking your child s temperature. The way you use it will depend on your child's age. Ask your child s healthcare provider for more information about how to use a thermometer on your child. General guidelines are:    The American Academy of Pediatrics advises that rectal temperatures are most accurate for children younger than 3 years. Accuracy is very important because babies must be seen right away by a healthcare provider if they have a fever. Be sure to use a rectal thermometer correctly. A rectal thermometer may accidentally poke a hole in (perforate) the rectum. It may also pass on germs from the stool. Always follow the product maker s directions for proper use. If you don t feel comfortable taking a rectal temperature, use another method. When you talk with your child s healthcare provider, tell him or her which method you used to take your child s temperature.    For toddlers, take the temperature under the armpit (axillary).    For children old enough to hold a thermometer in the mouth (usually around 4 or 5 years of age), take the temperature in the mouth (oral).    For children age 6 months and older, you can use an ear (tympanic) thermometer.    A forehead (temporal artery) thermometer may be used in babies and children of any age. This is a better way to screen for  fever than an armpit temperature.  Comfort care for fevers  If your child has a fever, here are some things you can do to help him or her feel better:    Give fluids to replace those lost through sweating with fever. Water is best, but low-sodium broths or soups, diluted fruit juice, or frozen juice bars can be used for older children. Talk with your healthcare provider about a plan. For an infant, breastmilk or formula is fine and all that is usually needed.    If your child has discomfort from the fever, check with your healthcare provider to see if you can use ibuprofen or acetaminophen to help reduce the fever. The correct dose for these medicines depends on your child's weight. Don t use ibuprofen in children younger than 6 months old. Never give aspirin to a child under age 18. It could cause a rare but serious condition called Reye syndrome.    Make sure your child gets lots of rest.    Dress your child lightly and change clothes often if he or she sweats a lot. Use only enough covers on the bed for your child to be comfortable.  Facts about fevers  Fever facts include the following:    Exercise, eating, excitement, and hot or cold drinks can all affect your child s temperature.    A child s reaction to fever can vary. Your child may feel fine with a high fever, or feel miserable with a slight fever.    If your child is active and alert, and is eating and drinking, you don't need to give fever medicine.    Temperatures are naturally lower between midnight and early morning and higher between late afternoon and early evening.  When to call your child's healthcare provider  Call the healthcare provider s office if your otherwise healthy child has any of the signs or symptoms below:    Fever (see Fever and children, below)    A seizure caused by the fever    Rapid breathing or shortness of breath    A stiff neck or headache    Trouble swallowing    Signs of dehydration. These include severe thirst, dark yellow  urine, infrequent urination, dull or sunken eyes, dry skin, and dry or cracked lips    Your child still doesn t look right to you, even after taking a nonaspirin pain reliever  Fever and children  Always use a digital thermometer to check your child s temperature. Never use a mercury thermometer.  Here are guidelines for fever temperature. Ear temperatures aren t accurate before 6 months of age. Don t take an oral temperature until your child is at least 4 years old. When you talk to your child s healthcare provider, tell him or her which method you used to take your child s temperature.  Infant under 3 months old:    Ask your child s healthcare provider how you should take the temperature.    Rectal or forehead (temporal artery) temperature of 100.4 F (38 C) or higher, or as directed by the provider    Armpit temperature of 99 F (37.2 C) or higher, or as directed by the provider  Child age 3 to 36 months:    Rectal, forehead (temporal artery), or ear temperature of 102 F (38.9 C) or higher, or as directed by the provider    Armpit temperature of 101 F (38.3 C) or higher, or as directed by the provider  Child of any age:    Repeated temperature of 104 F (40 C) or higher, or as directed by the provider    Fever that lasts more than 24 hours in a child under 2 years old. Or a fever that lasts for 3 days in a child 2 years or older.      Date Last Reviewed: 8/1/2016 2000-2018 [employer] [address] [place] [address], [place] [address]. All rights reserved. This information is not intended as a substitute for professional medical care. Always follow your healthcare professional's instructions.            oriented to person, place, time and situation

## 2023-02-27 NOTE — DISCHARGE NOTE PROVIDER - NSDCACTIVITY_GEN_ALL_CORE
Do not drive or operate machinery/Showering allowed/Stairs allowed/Walking - Indoors allowed/No heavy lifting/straining/Walking - Outdoors allowed/Follow Instructions Provided by your Surgical Team no

## 2023-02-27 NOTE — CONSULT NOTE ADULT - SUBJECTIVE AND OBJECTIVE BOX
Patient is a 57y old  Male who presents with a chief complaint of Left THR for severe left hip OA.   (27 Feb 2023 11:34)      HPI:  57M presented with report of injury to left hip 8/2022 when he was exiting his car.  His pain radiates from left hip to groin and left medial thigh. Pain is constant and rates 8/10 at worst.  now s/p Left THR.  No complaints at this time.       REVIEW OF SYSTEMS:  CONSTITUTIONAL: No fever, weight loss, or fatigue  EYES: No eye pain, visual disturbances, or discharge  ENMT:  No difficulty hearing, tinnitus, vertigo; No sinus or throat pain  NECK: No pain or stiffness  BREASTS: No pain, masses, or nipple discharge  RESPIRATORY: No cough, wheezing, chills or hemoptysis; No shortness of breath  CARDIOVASCULAR: No chest pain, palpitations, dizziness, or leg swelling  GASTROINTESTINAL: No abdominal or epigastric pain. No nausea, vomiting, or hematemesis; No diarrhea or constipation. No melena or hematochezia.  GENITOURINARY: No dysuria, frequency, hematuria, or incontinence  NEUROLOGICAL: No headaches, memory loss, loss of strength, numbness, or tremors  SKIN: No itching, burning, rashes, or lesions   LYMPH NODES: No enlarged glands  ENDOCRINE: No heat or cold intolerance; No hair loss  MUSCULOSKELETAL: No muscle or back pain  PSYCHIATRIC: No depression, anxiety, mood swings, or difficulty sleeping  HEME/LYMPH: No easy bruising, or bleeding gums  ALLERGY AND IMMUNOLOGIC: No hives or eczema    PAST MEDICAL & SURGICAL HISTORY:  Aneurysm    Anxiety    Osteoarthritis of left knee    COVID-19 vaccine series completed    Hypertension    History of arthroscopy of knee    History of arthroscopy of shoulder    History of lumbar fusion    History of surgery on wrist        SOCIAL HISTORY:  Residence: [ ] [facility]  [ ] [facility]  [x ] Community  [ ] Substance abuse: denies  [ ] Tobacco: quit years ago  [ ] Alcohol use: beer a couple of times a week.    Allergies  No Known Allergies      MEDICATIONS  (STANDING):  acetaminophen     Tablet .. 1000 milliGRAM(s) Oral every 8 hours  acetaminophen   IVPB .. 1000 milliGRAM(s) IV Intermittent once  ceFAZolin   IVPB 2000 milliGRAM(s) IV Intermittent every 8 hours  celecoxib 200 milliGRAM(s) Oral every 12 hours  DULoxetine 20 milliGRAM(s) Oral daily  lactated ringers. 1000 milliLiter(s) (125 mL/Hr) IV Continuous <Continuous>  pantoprazole    Tablet 40 milliGRAM(s) Oral before breakfast  polyethylene glycol 3350 17 Gram(s) Oral at bedtime  senna 2 Tablet(s) Oral at bedtime    MEDICATIONS  (PRN):  aluminum hydroxide/magnesium hydroxide/simethicone Suspension 30 milliLiter(s) Oral four times a day PRN Indigestion  HYDROmorphone  Injectable 0.5 milliGRAM(s) IV Push every 3 hours PRN breakthrough pain  magnesium hydroxide Suspension 30 milliLiter(s) Oral daily PRN Constipation  ondansetron Injectable 4 milliGRAM(s) IV Push every 6 hours PRN Nausea and/or Vomiting  oxyCODONE    IR 5 milliGRAM(s) Oral every 3 hours PRN Moderate Pain (4 - 6)  oxyCODONE    IR 10 milliGRAM(s) Oral every 3 hours PRN Severe Pain (7 - 10)      FAMILY HISTORY:  Family history of COPD (chronic obstructive pulmonary disease) (Mother)    Family history of colon cancer (Sibling)    FHx: heart disease (Father)        Vital Signs Last 24 Hrs  T(C): 36.4 (27 Feb 2023 11:56), Max: 36.6 (27 Feb 2023 06:46)  T(F): 97.5 (27 Feb 2023 11:56), Max: 97.9 (27 Feb 2023 06:46)  HR: 60 (27 Feb 2023 11:56) (54 - 69)  BP: 112/80 (27 Feb 2023 11:56) (102/72 - 134/87)  BP(mean): --  RR: 18 (27 Feb 2023 11:56) (12 - 20)  SpO2: 96% (27 Feb 2023 11:56) (95% - 100%)    Parameters below as of 27 Feb 2023 11:56  Patient On (Oxygen Delivery Method): room air        PHYSICAL EXAM:    GENERAL: NAD, well-groomed, well-developed  HEAD:  Atraumatic, Normocephalic  EYES: conjunctiva and sclera clear  ENMT:  Moist mucous membranes, Good dentition, No lesions  NECK: Supple, No JVD  NERVOUS SYSTEM:  Alert & Oriented X3, Good concentration; Moving all 4 extremities; No gross sensory deficits  CHEST/LUNG: Clear to auscultation bilaterally;  HEART: Regular rate and rhythm;   ABDOMEN: Soft, Nontender, Nondistended; Bowel sounds present  EXTREMITIES:  2+ Peripheral Pulses, No clubbing, cyanosis, or edema  LYMPH: No lymphadenopathy noted  /RECTAL: Not examined  BREAST: Not examined  SKIN: No rashes or lesions  INCISION: dressing dry and intact    LABS:              CAPILLARY BLOOD GLUCOSE          RADIOLOGY & ADDITIONAL STUDIES:    EKG: NSR  Personally Reviewed:  [x ] YES     Imaging: THR in place  Personally Reviewed:  [ x] YES     Consultant(s) Notes Reviewed:  pre-op med and cardiac clearance reviewed    Care Discussed with Consultants/Other Providers:

## 2023-02-27 NOTE — PHYSICAL THERAPY INITIAL EVALUATION ADULT - ACTIVE RANGE OF MOTION EXAMINATION, REHAB EVAL
left LE decreased due to sx./jerry. upper extremity Active ROM was WNL (within normal limits)/RLE Active ROM was WNL (within normal limits)/deficits as listed below

## 2023-02-27 NOTE — PHYSICAL THERAPY INITIAL EVALUATION ADULT - LEVEL OF INDEPENDENCE: STAND/SIT, REHAB EVAL
contact guard
Rib Fracture    WHAT YOU NEED TO KNOW:    A rib fracture is a crack or break in a rib bone. Rib fractures usually heal within 6 weeks. You should be able to return to normal activities before that time. Do not wrap anything around your body to try to splint your ribs. This can prevent you from taking deep breaths and increases your risk for pneumonia.Rib Cage         DISCHARGE INSTRUCTIONS:    Call 911 for any of the following:     You have trouble breathing.      You have new or increased pain.    Return to the emergency department if:     Your pain does not get better, even after treatment.      You have a fever or a cough.     Contact your healthcare provider if:     You have questions or concerns about your condition or care.        Medicines:     NSAIDs help decrease swelling and pain. NSAIDs are available without a doctor's order. Ask your healthcare provider which medicine is right for you. Ask how much to take and when to take it. Take as directed. NSAIDs can cause stomach bleeding and kidney problems if not taken correctly.      Prescription pain medicine may be given. Ask your healthcare provider how to take this medicine safely. Some prescription pain medicines contain acetaminophen. Do not take other medicines that contain acetaminophen without talking to your healthcare provider. Too much acetaminophen may cause liver damage. Prescription pain medicine may cause constipation. Ask your healthcare provider how to prevent or treat constipation.       Take your medicine as directed. Contact your healthcare provider if you think your medicine is not helping or if you have side effects. Tell him or her if you are allergic to any medicine. Keep a list of the medicines, vitamins, and herbs you take. Include the amounts, and when and why you take them. Bring the list or the pill bottles to follow-up visits. Carry your medicine list with you in case of an emergency.    Follow up with your healthcare provider as directed: Write down your questions so you remember to ask them during your visits.    Deep breathing: Deep breathing will decrease your risk for pneumonia. Hug a pillow on the injured side while doing this exercise, to decrease pain. Take a deep breath and hold it for as long as possible. You should let the air out and then cough strongly. Deep breaths help open your airway. You may be given an incentive spirometer to help take deep breaths. Put the plastic piece in your mouth. Take a slow, deep breath. You should then let the air out and cough. Repeat these steps 10 times every hour.    Rest: Rest and limit activity to decrease swelling and pain, and allow your injury to heal. Avoid activities that may cause more pain or damage to your ribs such as, pulling, pushing, and lifting. As your pain decreases, begin movements slowly. Take short walks between rest periods.    Ice: Apply ice on the fractured area for 15 to 20 minutes every hour or as directed. Use an ice pack or put crushed ice in a plastic bag. Cover it with a towel. Ice helps prevent tissue damage and decreases swelling and pain.      Traumatic Pneumothorax    WHAT YOU NEED TO KNOW:    A traumatic pneumothorax is when part of your lung collapses. A traumatic pneumothorax is caused by an injury that tears your lung and allows air to enter the pleural space. This is the area between your lungs and your chest wall. The air trapped in your pleural space prevents your lung from filling with air, which causes it to collapse. A pneumothorax can happen in one or both lungs. Injuries that cause a traumatic pneumothorax include bike accidents, motor vehicle accidents, gunshot wounds, and knife wounds.     Pneumothorax         DISCHARGE INSTRUCTIONS:    Medicines:   •Pain medicine:   You may need medicine to take away or decrease pain. ?Learn how to take your medicine. Ask what medicine and how much you should take. Be sure you know how, when, and how often to take it.      ?Do not wait until the pain is severe before you take your medicine. Tell your healthcare provider if your pain does not decrease.      ?Pain medicine can make you dizzy or sleepy. Prevent falls by calling someone when you get out of bed or if you need help.      •Antibiotics: This medicine is given to fight or prevent an infection caused by bacteria. Always take your antibiotics exactly as ordered by your healthcare provider. Do not stop taking your medicine unless directed by your healthcare provider. Never save antibiotics or take leftover antibiotics that were given to you for another illness.      •Take your medicine as directed. Contact your healthcare provider if you think your medicine is not helping or if you have side effects. Tell him or her if you are allergic to any medicine. Keep a list of the medicines, vitamins, and herbs you take. Include the amounts, and when and why you take them. Bring the list or the pill bottles to follow-up visits. Carry your medicine list with you in case of an emergency.      Breathing exercises: You may need to do breathing exercises to strengthen your lungs. Ask your healthcare provider how to do these exercises, and how long you should do them.    Do not smoke: If you smoke, it is never too late to quit. Ask for information about how to stop smoking if you need help.    Follow up with your healthcare provider as directed: You may need to return for more chest x-rays. Write down your questions so you remember to ask them during your visits.    For your safety: Do not dive underwater or climb to high altitudes after a pneumothorax. Do not fly if you have an untreated or recurring pneumothorax. The change of pressure could cause another pneumothorax. Ask your healthcare provider when it is safe to fly, dive, or climb to high altitudes.    Contact your healthcare provider if:   •You have new or worse signs and symptoms.      •You have questions about your condition or care.      Seek care immediately or call 911 if:   •You are sweating and feel like you are going to pass out.      •Your fingernails, toenails, or lips begin to turn blue.      •Your neck veins become larger than usual.      •Your throat or the front of your neck is pushed to one side.      •You have new or increased shortness of breath.

## 2023-02-27 NOTE — PHYSICAL THERAPY INITIAL EVALUATION ADULT - PRECAUTIONS/LIMITATIONS, REHAB EVAL
Posterior hip precautions: No IR, No abduction, No hip flexion<90 degrees/fall precautions/left hip precautions

## 2023-02-27 NOTE — DISCHARGE NOTE PROVIDER - HOSPITAL COURSE
This patient was admitted to [facility] with a history of severe degenerative joint disease of the left hip.  Patient underwent Pre-Surgical Testing and was medically cleared to undergo elective procedure. Patient underwent Left THR by Dr. Raul Olivier on 2/27/23. Procedure was well tolerated.  No operative or geovani-operative complications arose during patient's hospital course.  Patient received antibiotic according to SCIP guidelines for infection prevention.  Aspirin 81mg q 12 h was given for DVT prophylaxis, in addition to the use of SCDs.  Anesthesia, Medical Hospitalist, Physical Therapy and Occupational Therapy were consulted. Patient is stable for discharge with a good prognosis.  Appropriate discharge instructions and medications are provided in this document.

## 2023-02-28 VITALS
OXYGEN SATURATION: 95 % | RESPIRATION RATE: 16 BRPM | SYSTOLIC BLOOD PRESSURE: 100 MMHG | DIASTOLIC BLOOD PRESSURE: 60 MMHG | HEART RATE: 62 BPM | TEMPERATURE: 98 F

## 2023-02-28 LAB
ANION GAP SERPL CALC-SCNC: 6 MMOL/L — SIGNIFICANT CHANGE UP (ref 5–17)
BUN SERPL-MCNC: 9 MG/DL — SIGNIFICANT CHANGE UP (ref 7–23)
CALCIUM SERPL-MCNC: 8.3 MG/DL — LOW (ref 8.4–10.5)
CHLORIDE SERPL-SCNC: 102 MMOL/L — SIGNIFICANT CHANGE UP (ref 96–108)
CO2 SERPL-SCNC: 28 MMOL/L — SIGNIFICANT CHANGE UP (ref 22–31)
CREAT SERPL-MCNC: 0.95 MG/DL — SIGNIFICANT CHANGE UP (ref 0.5–1.3)
EGFR: 93 ML/MIN/1.73M2 — SIGNIFICANT CHANGE UP
GLUCOSE SERPL-MCNC: 102 MG/DL — HIGH (ref 70–99)
HCT VFR BLD CALC: 37.6 % — LOW (ref 39–50)
HGB BLD-MCNC: 11.9 G/DL — LOW (ref 13–17)
MCHC RBC-ENTMCNC: 27.9 PG — SIGNIFICANT CHANGE UP (ref 27–34)
MCHC RBC-ENTMCNC: 31.6 GM/DL — LOW (ref 32–36)
MCV RBC AUTO: 88.3 FL — SIGNIFICANT CHANGE UP (ref 80–100)
NRBC # BLD: 0 /100 WBCS — SIGNIFICANT CHANGE UP (ref 0–0)
PLATELET # BLD AUTO: 206 K/UL — SIGNIFICANT CHANGE UP (ref 150–400)
POTASSIUM SERPL-MCNC: 4 MMOL/L — SIGNIFICANT CHANGE UP (ref 3.5–5.3)
POTASSIUM SERPL-SCNC: 4 MMOL/L — SIGNIFICANT CHANGE UP (ref 3.5–5.3)
RBC # BLD: 4.26 M/UL — SIGNIFICANT CHANGE UP (ref 4.2–5.8)
RBC # FLD: 12.9 % — SIGNIFICANT CHANGE UP (ref 10.3–14.5)
SODIUM SERPL-SCNC: 136 MMOL/L — SIGNIFICANT CHANGE UP (ref 135–145)
WBC # BLD: 7.71 K/UL — SIGNIFICANT CHANGE UP (ref 3.8–10.5)
WBC # FLD AUTO: 7.71 K/UL — SIGNIFICANT CHANGE UP (ref 3.8–10.5)

## 2023-02-28 PROCEDURE — 80048 BASIC METABOLIC PNL TOTAL CA: CPT

## 2023-02-28 PROCEDURE — 73501 X-RAY EXAM HIP UNI 1 VIEW: CPT

## 2023-02-28 PROCEDURE — 97110 THERAPEUTIC EXERCISES: CPT

## 2023-02-28 PROCEDURE — C1713: CPT

## 2023-02-28 PROCEDURE — 36415 COLL VENOUS BLD VENIPUNCTURE: CPT

## 2023-02-28 PROCEDURE — 97530 THERAPEUTIC ACTIVITIES: CPT

## 2023-02-28 PROCEDURE — 85027 COMPLETE CBC AUTOMATED: CPT

## 2023-02-28 PROCEDURE — 97535 SELF CARE MNGMENT TRAINING: CPT

## 2023-02-28 PROCEDURE — 97165 OT EVAL LOW COMPLEX 30 MIN: CPT

## 2023-02-28 PROCEDURE — C1776: CPT

## 2023-02-28 PROCEDURE — 97116 GAIT TRAINING THERAPY: CPT

## 2023-02-28 PROCEDURE — 97161 PT EVAL LOW COMPLEX 20 MIN: CPT

## 2023-02-28 PROCEDURE — 99232 SBSQ HOSP IP/OBS MODERATE 35: CPT

## 2023-02-28 RX ORDER — HYDROMORPHONE HYDROCHLORIDE 2 MG/ML
1 INJECTION INTRAMUSCULAR; INTRAVENOUS; SUBCUTANEOUS
Qty: 42 | Refills: 0
Start: 2023-02-28 | End: 2023-03-06

## 2023-02-28 RX ORDER — HYDROMORPHONE HYDROCHLORIDE 2 MG/ML
1 INJECTION INTRAMUSCULAR; INTRAVENOUS; SUBCUTANEOUS
Qty: 28 | Refills: 0
Start: 2023-02-28 | End: 2023-03-06

## 2023-02-28 RX ORDER — HYDROMORPHONE HYDROCHLORIDE 2 MG/ML
4 INJECTION INTRAMUSCULAR; INTRAVENOUS; SUBCUTANEOUS
Refills: 0 | Status: DISCONTINUED | OUTPATIENT
Start: 2023-02-28 | End: 2023-02-28

## 2023-02-28 RX ORDER — HYDROMORPHONE HYDROCHLORIDE 2 MG/ML
2 INJECTION INTRAMUSCULAR; INTRAVENOUS; SUBCUTANEOUS
Refills: 0 | Status: DISCONTINUED | OUTPATIENT
Start: 2023-02-28 | End: 2023-02-28

## 2023-02-28 RX ADMIN — CELECOXIB 200 MILLIGRAM(S): 200 CAPSULE ORAL at 09:45

## 2023-02-28 RX ADMIN — DULOXETINE HYDROCHLORIDE 20 MILLIGRAM(S): 30 CAPSULE, DELAYED RELEASE ORAL at 14:02

## 2023-02-28 RX ADMIN — HYDROMORPHONE HYDROCHLORIDE 2 MILLIGRAM(S): 2 INJECTION INTRAMUSCULAR; INTRAVENOUS; SUBCUTANEOUS at 09:22

## 2023-02-28 RX ADMIN — Medication 1000 MILLIGRAM(S): at 06:19

## 2023-02-28 RX ADMIN — SODIUM CHLORIDE 125 MILLILITER(S): 9 INJECTION, SOLUTION INTRAVENOUS at 08:40

## 2023-02-28 RX ADMIN — CELECOXIB 200 MILLIGRAM(S): 200 CAPSULE ORAL at 09:21

## 2023-02-28 RX ADMIN — OXYCODONE HYDROCHLORIDE 10 MILLIGRAM(S): 5 TABLET ORAL at 00:06

## 2023-02-28 RX ADMIN — HYDROMORPHONE HYDROCHLORIDE 2 MILLIGRAM(S): 2 INJECTION INTRAMUSCULAR; INTRAVENOUS; SUBCUTANEOUS at 13:34

## 2023-02-28 RX ADMIN — Medication 1000 MILLIGRAM(S): at 14:02

## 2023-02-28 RX ADMIN — Medication 81 MILLIGRAM(S): at 05:59

## 2023-02-28 RX ADMIN — Medication 101.6 MILLIGRAM(S): at 05:59

## 2023-02-28 RX ADMIN — OXYCODONE HYDROCHLORIDE 10 MILLIGRAM(S): 5 TABLET ORAL at 00:30

## 2023-02-28 RX ADMIN — Medication 1000 MILLIGRAM(S): at 05:59

## 2023-02-28 RX ADMIN — HYDROMORPHONE HYDROCHLORIDE 2 MILLIGRAM(S): 2 INJECTION INTRAMUSCULAR; INTRAVENOUS; SUBCUTANEOUS at 13:04

## 2023-02-28 RX ADMIN — HYDROMORPHONE HYDROCHLORIDE 2 MILLIGRAM(S): 2 INJECTION INTRAMUSCULAR; INTRAVENOUS; SUBCUTANEOUS at 10:02

## 2023-02-28 RX ADMIN — PANTOPRAZOLE SODIUM 40 MILLIGRAM(S): 20 TABLET, DELAYED RELEASE ORAL at 05:59

## 2023-02-28 RX ADMIN — Medication 1000 MILLIGRAM(S): at 14:34

## 2023-02-28 NOTE — PROGRESS NOTE ADULT - SUBJECTIVE AND OBJECTIVE BOX
patient "itchy" with oxycodone last nite  no sob no dyspnea no hives    dilaudid 2mg q3h prn mod  dilaudid 4mg po q3h prn severe pain  discussed with patient agrees with plan patient "itchy" with oxycodone last nite  no sob no dyspnea no hives    dilaudid 2mg q3h prn mod  dilaudid 4mg po q3h prn severe pain  discussed with patient agrees with plan      discussed with Jany Jordan and Patient  as per  insurance company will only allow for Dilaudid 2mg every 6 hours x7 days  patient aware he  will need to call Dr. Olivier office for more pain medications if needed by end of week thursday friday . Patient aware and understand and agrees with plan  Dr. Olivier called and notified

## 2023-02-28 NOTE — CASE MANAGEMENT PROGRESS NOTE - NSCMPROGRESSNOTE_GEN_ALL_CORE
[facility] [phone] delivered rolling walker, commode to bedside. [facility] 3/1/2023

## 2023-02-28 NOTE — PROGRESS NOTE ADULT - SUBJECTIVE AND OBJECTIVE BOX
POST OPERATIVE DAY #:  1  STATUS POST: Left THR                       SUBJECTIVE: Patient seen and examined. Patient is resting comfortably and is without complaints. Denies nausea/vomiting/diarrhea, chest pain, shortness of breath, headache, dizziness.   Reported Pain score = 2/10    OBJECTIVE:     Vital Signs Last 24 Hrs  T(C): 36.6 (28 Feb 2023 08:03), Max: 36.6 (27 Feb 2023 23:30)  T(F): 97.9 (28 Feb 2023 08:03), Max: 97.9 (27 Feb 2023 23:30)  HR: 62 (28 Feb 2023 08:03) (54 - 67)  BP: 100/60 (28 Feb 2023 08:03) (100/60 - 123/81)  BP(mean): --  RR: 16 (28 Feb 2023 08:03) (12 - 20)  SpO2: 95% (28 Feb 2023 08:03) (93% - 100%)    Parameters below as of 28 Feb 2023 03:17  Patient On (Oxygen Delivery Method): room air        Left hip:          Dressing: clean/dry/intact    Bilateral LEs:         Sensation:  intact to light touch          Motor exam:  5/5 dorsiflexion/plantarflexion/EHL          2+ DP pulses          calf supple, NT         Abduction pillow in place         SCDs in place    LABS:  pending        MEDICATIONS:  Anticoagulation:  aspirin enteric coated 81 milliGRAM(s) Oral two times a day      Pain medications:   acetaminophen     Tablet .. 1000 milliGRAM(s) Oral every 8 hours  celecoxib 200 milliGRAM(s) Oral every 12 hours  DULoxetine 20 milliGRAM(s) Oral daily  HYDROmorphone  Injectable 0.5 milliGRAM(s) IV Push every 3 hours PRN  ondansetron Injectable 4 milliGRAM(s) IV Push every 6 hours PRN  oxyCODONE    IR 5 milliGRAM(s) Oral every 3 hours PRN  oxyCODONE    IR 10 milliGRAM(s) Oral every 3 hours PRN        A/P :   s/p Left THR POD # 1  -    Pain control  -    DVT ppx: ASA  -    Weight bearing status: WBAT   -    Continue total hip precautions  -    Physical Therapy  -    Occupational Therapy  -    Discharge plan: home today pending PT/OT/medicine clearance

## 2023-02-28 NOTE — PROGRESS NOTE ADULT - PROBLEM SELECTOR PLAN 1
Pain Management: acceptable- continue current care Tylenol ATC/Celebrex ATC/ Oxycodone PRN  Continue PT/OT  DVT proph: [ x ] low risk - Aspirin  DC plan:  [  x] Home with HC

## 2023-02-28 NOTE — PHARMACOTHERAPY INTERVENTION NOTE - COMMENTS
Transition of Care video discharge education - medication calendar given to patient  
Admission medication reconciliation POD1

## 2023-02-28 NOTE — SBIRT NOTE ADULT - NSSBIRTBRIEFINTDET_GEN_A_CORE
offered resources and outpatient referrals, Patient does not feel his alcohol consumption is problematic at this time, educated on mindful consumption

## 2023-02-28 NOTE — PROGRESS NOTE ADULT - SUBJECTIVE AND OBJECTIVE BOX
Patient is a 57y old  Male who presents with a chief complaint of Left THR for severe left hip OA.   (27 Feb 2023 11:34)      INTERVAL HPI/OVERNIGHT EVENTS:  feeling well, no complaints, pain controlled.  wants to go home.       MEDICATIONS  (STANDING):  acetaminophen     Tablet .. 1000 milliGRAM(s) Oral every 8 hours  aspirin enteric coated 81 milliGRAM(s) Oral two times a day  celecoxib 200 milliGRAM(s) Oral every 12 hours  DULoxetine 20 milliGRAM(s) Oral daily  lactated ringers. 1000 milliLiter(s) (125 mL/Hr) IV Continuous <Continuous>  pantoprazole    Tablet 40 milliGRAM(s) Oral before breakfast  polyethylene glycol 3350 17 Gram(s) Oral at bedtime  senna 2 Tablet(s) Oral at bedtime    MEDICATIONS  (PRN):  aluminum hydroxide/magnesium hydroxide/simethicone Suspension 30 milliLiter(s) Oral four times a day PRN Indigestion  HYDROmorphone   Tablet 2 milliGRAM(s) Oral every 3 hours PRN Moderate Pain (4 - 6)  HYDROmorphone   Tablet 4 milliGRAM(s) Oral every 3 hours PRN Severe Pain (7 - 10)  HYDROmorphone  Injectable 0.5 milliGRAM(s) IV Push every 3 hours PRN breakthrough pain  magnesium hydroxide Suspension 30 milliLiter(s) Oral daily PRN Constipation  ondansetron Injectable 4 milliGRAM(s) IV Push every 6 hours PRN Nausea and/or Vomiting      Allergies  No Known Allergies      REVIEW OF SYSTEMS:  CONSTITUTIONAL: No fever, weight loss, or fatigue  EYES: No eye pain, visual disturbances, or discharge  ENMT:  No difficulty hearing, tinnitus, vertigo; No sinus or throat pain  NECK: No pain or stiffness  BREASTS: No pain, masses, or nipple discharge  RESPIRATORY: No cough, wheezing, chills or hemoptysis; No shortness of breath  CARDIOVASCULAR: No chest pain, palpitations, or lightheadedness  GASTROINTESTINAL: No abdominal or epigastric pain. No nausea, vomiting, or hematemesis; No diarrhea or constipation. No melena or hematochezia.  GENITOURINARY: No dysuria, frequency, hematuria, or incontinence  NEUROLOGICAL: No headaches, vertigo, memory loss, loss of strength, numbness, or tremors  SKIN: No itching, burning, rashes, or lesions   LYMPH NODES: No enlarged glands  ENDOCRINE: No heat or cold intolerance; No hair loss; No polydipsia or polyuria  MUSCULOSKELETAL: No back pain  PSYCHIATRIC: No depression, anxiety, or mood swings  HEME/LYMPH: No easy bruising, or bleeding gums  ALLERGY AND IMMUNOLOGIC: No hives or eczema    Vital Signs Last 24 Hrs  T(C): 36.6 (28 Feb 2023 08:03), Max: 36.6 (27 Feb 2023 23:30)  T(F): 97.9 (28 Feb 2023 08:03), Max: 97.9 (27 Feb 2023 23:30)  HR: 62 (28 Feb 2023 08:03) (57 - 67)  BP: 100/60 (28 Feb 2023 08:03) (100/60 - 123/81)  BP(mean): --  RR: 16 (28 Feb 2023 08:03) (16 - 18)  SpO2: 95% (28 Feb 2023 08:03) (93% - 96%)    Parameters below as of 28 Feb 2023 03:17  Patient On (Oxygen Delivery Method): room air      PHYSICAL EXAM:  GENERAL: NAD, well-groomed, well-developed  HEAD:  Atraumatic, Normocephalic  EYES:  conjunctiva and sclera clear  ENMT: Moist mucous membranes  NECK: Supple, No JVD  NERVOUS SYSTEM:  Alert & Oriented X3, Good concentration; Bilateral LE mobile, sensation to light touch intact  CHEST/LUNG: Clear to auscultation bilaterally;   HEART: Regular rate and rhythm;   ABDOMEN: Soft, Nontender, Nondistended; Bowel sounds present  EXTREMITIES:  2+ Peripheral Pulses, No clubbing or cyanosis  LYMPH: No lymphadenopathy noted  SKIN: No rashes or lesions  INCISION:  Dressing dry and intact    LABS:                        11.9   7.71  )-----------( 206      ( 28 Feb 2023 08:00 )             37.6     28 Feb 2023 08:00    136    |  102    |  9      ----------------------------<  102    4.0     |  28     |  0.95     Ca    8.3        28 Feb 2023 08:00          CAPILLARY BLOOD GLUCOSE          RADIOLOGY & ADDITIONAL TESTS:    Imaging Personally Reviewed:      [ ] Consultant(s) Notes Reviewed  [x] Care Discussed with Consultants/Other Providers:  Ortho PA- plan of care

## 2023-03-01 ENCOUNTER — FORM ENCOUNTER (OUTPATIENT)
Age: 58
End: 2023-03-01

## 2023-03-02 ENCOUNTER — NON-APPOINTMENT (OUTPATIENT)
Age: 58
End: 2023-03-02

## 2023-03-02 RX ORDER — HYDROMORPHONE HYDROCHLORIDE 2 MG/1
2 TABLET ORAL
Qty: 60 | Refills: 0 | Status: ACTIVE | COMMUNITY
Start: 2023-03-02 | End: 1900-01-01

## 2023-03-14 ENCOUNTER — APPOINTMENT (OUTPATIENT)
Dept: ORTHOPEDIC SURGERY | Facility: CLINIC | Age: 58
End: 2023-03-14
Payer: OTHER MISCELLANEOUS

## 2023-03-14 ENCOUNTER — NON-APPOINTMENT (OUTPATIENT)
Age: 58
End: 2023-03-14

## 2023-03-14 VITALS — BODY MASS INDEX: 29.99 KG/M2 | HEIGHT: 65 IN | WEIGHT: 180 LBS

## 2023-03-14 PROCEDURE — 99024 POSTOP FOLLOW-UP VISIT: CPT

## 2023-03-14 PROCEDURE — 73503 X-RAY EXAM HIP UNI 4/> VIEWS: CPT | Mod: LT

## 2023-03-14 NOTE — HISTORY OF PRESENT ILLNESS
[Left Leg] : left leg [Work related] : work related [Gradual] : gradual [Sudden] : sudden [Dull/Aching] : dull/aching [Localized] : localized [Sharp] : sharp [Intermittent] : intermittent [Rest] : rest [Nothing helps with pain getting better] : Nothing helps with pain getting better [Walking] : walking [Exercising] : exercising [Not working due to injury] : Work status: not working due to injury [6] : 6 [5] : 5 [Meds] : meds [de-identified] : WC 8/2/22:\par \par 12/02/22 PT PRESENTS HERE TODAY WITH LEFT HIP PAIN AFTER GETTING HURT AT WORK\par PT HAS TRY AN INJECTION DONE BY DR RYAN WITH NO RELIEF \par \par 3/14/2023 PT IS HERE FOR FIRST POST OP VISIT S/P L SCOT DONE ON 2/27/23 BY DR. COKER. DOING WELL [] : no [FreeTextEntry1] : LEFT HIP  [FreeTextEntry3] : 08/02/22 [FreeTextEntry5] : WORK INJURY  [de-identified] : DR RYAN/SANTIAGO  [de-identified] : X RAYS AND MRI DONE AT [facility] [de-identified] : SX [de-identified] : 2/27/23 [de-identified] : Left total hip arthroplasty

## 2023-03-14 NOTE — ASSESSMENT
[FreeTextEntry1] : S/P LT SCOT 2/27/23. DOING WELL. NO F/C/S. WE DISCUSSED THE IMPORTANCE OF ELEVATION TO REDUCE SWELLING, XRAYS REVIEWED WITH COMPONENTS WELL FIXED. NO SIGNS OF INFECTION. PROPER ELEVATION TECHNIQUES DISCUSSED. ABX AND PRECAUTIONS REVIEWED. PT RX. QUESTIONS ANSWERED.

## 2023-04-11 ENCOUNTER — APPOINTMENT (OUTPATIENT)
Dept: ORTHOPEDIC SURGERY | Facility: CLINIC | Age: 58
End: 2023-04-11
Payer: OTHER MISCELLANEOUS

## 2023-04-11 VITALS — WEIGHT: 180 LBS | BODY MASS INDEX: 29.99 KG/M2 | HEIGHT: 65 IN

## 2023-04-11 PROBLEM — Z92.29 PERSONAL HISTORY OF OTHER DRUG THERAPY: Chronic | Status: ACTIVE | Noted: 2023-02-10

## 2023-04-11 PROBLEM — M17.12 UNILATERAL PRIMARY OSTEOARTHRITIS, LEFT KNEE: Chronic | Status: ACTIVE | Noted: 2023-02-10

## 2023-04-11 PROBLEM — I10 ESSENTIAL (PRIMARY) HYPERTENSION: Chronic | Status: ACTIVE | Noted: 2023-02-10

## 2023-04-11 PROCEDURE — 99024 POSTOP FOLLOW-UP VISIT: CPT

## 2023-04-11 NOTE — HISTORY OF PRESENT ILLNESS
[Left Leg] : left leg [Work related] : work related [Gradual] : gradual [Sudden] : sudden [5] : 5 [4] : 4 [Dull/Aching] : dull/aching [Localized] : localized [Sharp] : sharp [Intermittent] : intermittent [Rest] : rest [Meds] : meds [Nothing helps with pain getting better] : Nothing helps with pain getting better [Walking] : walking [Exercising] : exercising [Not working due to injury] : Work status: not working due to injury [de-identified] : WC 8/2/22:\par \par 12/02/22 PT PRESENTS HERE TODAY WITH LEFT HIP PAIN AFTER GETTING HURT AT WORK\par PT HAS TRY AN INJECTION DONE BY DR RYAN WITH NO RELIEF \par \par 3/14/2023 PT IS HERE FOR FIRST POST OP VISIT S/P L SCOT DONE ON 2/27/23 BY DR. COKER. DOING WELL\par \par 4/11/2023 PT IS HERE FOR SECOND POST OP VISIT S/P L SCOT. DOING WELL. PAIN IS IMPROVING. GOING TO PT 2-3X WEEKLY.  [] : no [FreeTextEntry1] : LEFT HIP  [FreeTextEntry3] : 08/02/22 [FreeTextEntry5] : WORK INJURY  [de-identified] : DR RYAN/SANTIAGO  [de-identified] : X RAYS AND MRI DONE AT [facility] [de-identified] : PT [de-identified] : 2/27/23 [de-identified] : Left total hip arthroplasty

## 2023-05-03 ENCOUNTER — FORM ENCOUNTER (OUTPATIENT)
Age: 58
End: 2023-05-03

## 2023-05-03 NOTE — ED ADULT NURSE NOTE - BREATHING
Patient's (Body mass index is 47.86 kg/m².) indicates that they are morbidly/severely obese (BMI > 40 or > 35 with obesity - related health condition) with health conditions that include hypertension, dyslipidemias and GERD . Weight is improving with treatment. BMI  is above average; BMI management plan is completed. We discussed portion control, increasing exercise and pharmacologic options including Wegovy .    spontaneous

## 2023-05-09 ENCOUNTER — NON-APPOINTMENT (OUTPATIENT)
Age: 58
End: 2023-05-09

## 2023-05-09 ENCOUNTER — APPOINTMENT (OUTPATIENT)
Dept: ORTHOPEDIC SURGERY | Facility: CLINIC | Age: 58
End: 2023-05-09
Payer: OTHER MISCELLANEOUS

## 2023-05-09 VITALS — WEIGHT: 180 LBS | HEIGHT: 65 IN | BODY MASS INDEX: 29.99 KG/M2

## 2023-05-09 PROCEDURE — 73502 X-RAY EXAM HIP UNI 2-3 VIEWS: CPT

## 2023-05-09 PROCEDURE — 99024 POSTOP FOLLOW-UP VISIT: CPT

## 2023-05-09 NOTE — ASSESSMENT
[FreeTextEntry1] : S/P LT SCOT 2/27/23. DOING WELL. NO F/C/S. WE DISCUSSED THE IMPORTANCE OF ELEVATION TO REDUCE SWELLING, XRAYS REVIEWED WITH COMPONENTS WELL FIXED. NO SIGNS OF INFECTION. PROPER ELEVATION TECHNIQUES DISCUSSED. ABX AND PRECAUTIONS REVIEWED. PT RX. QUESTIONS ANSWERED. \par PT IS MEDICALLY NECESSARY S/P LT SCOT.

## 2023-06-06 ENCOUNTER — APPOINTMENT (OUTPATIENT)
Dept: ORTHOPEDIC SURGERY | Facility: CLINIC | Age: 58
End: 2023-06-06
Payer: OTHER MISCELLANEOUS

## 2023-06-06 VITALS — HEIGHT: 65 IN | BODY MASS INDEX: 29.99 KG/M2 | WEIGHT: 180 LBS

## 2023-06-06 PROCEDURE — 99213 OFFICE O/P EST LOW 20 MIN: CPT

## 2023-06-06 NOTE — ASSESSMENT
[FreeTextEntry1] : S/P LT SCOT 2/27/23. DOING WELL. NO F/C/S. WE DISCUSSED THE IMPORTANCE OF ELEVATION TO REDUCE SWELLING, XRAYS FROM LAST VISIT REVIEWED WITH COMPONENTS WELL FIXED. NO SIGNS OF INFECTION. PROPER ELEVATION TECHNIQUES DISCUSSED. CONTINUE PT. ANTICIPATE RTW NEXT MONTH AS HE CONTINUES TO IMPROVE. MORE PT NECESSARY. \par PT IS MEDICALLY NECESSARY S/P LT SCOT.

## 2023-06-06 NOTE — HISTORY OF PRESENT ILLNESS
[6] : 6 [0] : 0 [Work related] : work related [Not working due to injury] : Work status: not working due to injury [] : yes [de-identified] : 6/6/23 Pt is here for follow up visit s/p L SCOT done on 2/27/23. Is doing well. Going to PT 2x weekly. Improved overall since last visit but still having some difficulty with stairs, pain in the groin.  [FreeTextEntry1] : LT hip [de-identified] : PT

## 2023-07-18 ENCOUNTER — APPOINTMENT (OUTPATIENT)
Dept: ORTHOPEDIC SURGERY | Facility: CLINIC | Age: 58
End: 2023-07-18
Payer: OTHER MISCELLANEOUS

## 2023-07-18 PROCEDURE — 99214 OFFICE O/P EST MOD 30 MIN: CPT

## 2023-07-18 NOTE — ASSESSMENT
[FreeTextEntry1] : S/P LT SCOT 2/27/23. DOING WELL. WILL CONTINUE WITH HEP. PRECUATIONS AND ABX PPX REVIEWED. FOLLOW UP IN 2 MONTHS. \par RIGHT HIP PAIN CONSEQUENTIAL TO ALTERED GIT FROM LEFT HIP INJURY. REQUEST TO TRET RIGHT HIP

## 2023-07-18 NOTE — RETURN TO WORK/SCHOOL
[Work] : work [___ Months] : I will re-evaluate the patient in [unfilled] month(s), at which time I will provide an update to their current status [Return Date: _____] : as of [unfilled].  This has been discussed in detail with ~Piyush~ and ~he/she~ understands this. [Full Duty] : full duty [FreeTextEntry1] : MAY RTN FD NO RESTRICTIONS

## 2023-07-18 NOTE — HISTORY OF PRESENT ILLNESS
[Work related] : work related [1] : 2 [0] : 0 [Physical therapy] : physical therapy [Not working due to injury] : Work status: not working due to injury [] : yes [de-identified] : 6/6/23 Pt is here for follow up visit s/p L SCOT done on 2/27/23. Is doing well. Going to PT 2x weekly. Improved overall since last visit but still having some difficulty with stairs, pain in the groin. \par \par 7/18/23: Doing well s/p SCOT. PT AND HEP [FreeTextEntry1] : LT hip [de-identified] : PT

## 2023-07-18 NOTE — WORK
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

## 2024-03-05 ENCOUNTER — APPOINTMENT (OUTPATIENT)
Dept: ORTHOPEDIC SURGERY | Facility: CLINIC | Age: 59
End: 2024-03-05
Payer: OTHER MISCELLANEOUS

## 2024-03-05 VITALS — WEIGHT: 180 LBS | BODY MASS INDEX: 29.99 KG/M2 | HEIGHT: 65 IN

## 2024-03-05 PROCEDURE — 99455 WORK RELATED DISABILITY EXAM: CPT

## 2024-03-05 NOTE — WORK
[Partial] : partial [FreeTextEntry1] : [employer].  [Left] : left [Right] : right [FreeTextEntry7] : HIP [FreeTextEntry5] : 35 [de-identified] : S/P LT SCOT 2/27/23. [de-identified] : HIP [de-identified] : 10 [de-identified] : RT HIP PRIMARY OA WITH LIMITED INTERNAL ROTATION.

## 2024-03-05 NOTE — HISTORY OF PRESENT ILLNESS
[1] : 2 [Work related] : work related [0] : 0 [Not working due to injury] : Work status: not working due to injury [Physical therapy] : physical therapy [] : yes [de-identified] : 6/6/23 Pt is here for follow up visit s/p L SCOT done on 2/27/23. Is doing well. Going to PT 2x weekly. Improved overall since last visit but still having some difficulty with stairs, pain in the groin. \par  \par  7/18/23: Doing well s/p SCOT. PT AND HEP [FreeTextEntry1] : LT hip [de-identified] : PT

## 2024-03-05 NOTE — ASSESSMENT
[FreeTextEntry1] : S/P LT SCOT 2/27/23. DOING WELL. WILL CONTINUE WITH HEP. PRECUATIONS AND ABX PPX REVIEWED. FOLLOW UP IN 2 MONTHS.  RIGHT HIP PAIN CONSEQUENTIAL TO ALTERED GIT FROM LEFT HIP INJURY. REQUEST TO TREAT RIGHT HIP   Impairment percentage was determined by [place] [employer] Permanent Impairment 2018 Guidelines.

## 2024-06-18 ENCOUNTER — APPOINTMENT (OUTPATIENT)
Dept: ORTHOPEDIC SURGERY | Facility: CLINIC | Age: 59
End: 2024-06-18
Payer: OTHER MISCELLANEOUS

## 2024-06-18 VITALS — BODY MASS INDEX: 28.66 KG/M2 | HEIGHT: 65 IN | WEIGHT: 172 LBS

## 2024-06-18 DIAGNOSIS — M16.11 UNILATERAL PRIMARY OSTEOARTHRITIS, RIGHT HIP: ICD-10-CM

## 2024-06-18 PROCEDURE — 99215 OFFICE O/P EST HI 40 MIN: CPT

## 2024-06-18 PROCEDURE — 73502 X-RAY EXAM HIP UNI 2-3 VIEWS: CPT

## 2024-06-18 NOTE — WORK
[Partial] : partial [Left] : left [Right] : right [FreeTextEntry1] : [employer].  [FreeTextEntry7] : HIP [FreeTextEntry5] : 35 [de-identified] : S/P LT SCOT 2/27/23. [de-identified] : HIP [de-identified] : 10 [de-identified] : RT HIP PRIMARY OA WITH LIMITED INTERNAL ROTATION.

## 2024-06-18 NOTE — ASSESSMENT
[FreeTextEntry1] : S/P LT SCOT 2/27/23. DOING WELL. WILL CONTINUE WITH HEP. PRECUATIONS AND ABX PPX REVIEWED. FOLLOW UP IN 2 MONTHS.  RIGHT HIP PAIN CONSEQUENTIAL TO ALTERED GIT FROM LEFT HIP INJURY. REQUEST TO TREAT RIGHT HIP   NOW WITH LT HIP/GLUTE PAIN THAT SOMETIMES RADIATES DOWN THE LLE. POSITIVE SLR ON EXAM. PAIN IS LIKELY RADICULAR IN NATURE. H/O LUMBAR FUSION. ADVISED TO FOLLOW UP WITH SPINE SURGEON TO DISCUSS FURTHER.

## 2024-06-18 NOTE — HISTORY OF PRESENT ILLNESS
[Work related] : work related [8] : 8 [0] : 0 [Physical therapy] : physical therapy [Not working due to injury] : Work status: not working due to injury [] : yes [de-identified] : 6/6/23 Pt is here for follow up visit s/p L SCOT done on 2/27/23. Is doing well. Going to PT 2x weekly. Improved overall since last visit but still having some difficulty with stairs, pain in the groin.   7/18/23: Doing well s/p SCOT. PT AND HEP  6.18.24 PATIENT HERE FOR LEFT HIP. UNTIL ABOUT A WEEK AGO PATIENT STATES EVERYTHING WAS FINE, BUT HE HAS BEEN WALKING MORE AND IS UNSURE IF THAT CAUSED THE PAIN TO RETURN. DOS:2.27.23 [FreeTextEntry1] : LT hip [de-identified] : PT

## 2024-06-19 ENCOUNTER — APPOINTMENT (OUTPATIENT)
Dept: ORTHOPEDIC SURGERY | Facility: CLINIC | Age: 59
End: 2024-06-19
Payer: COMMERCIAL

## 2024-06-19 DIAGNOSIS — Z96.642 PRESENCE OF LEFT ARTIFICIAL HIP JOINT: ICD-10-CM

## 2024-06-19 PROCEDURE — 72100 X-RAY EXAM L-S SPINE 2/3 VWS: CPT

## 2024-06-19 PROCEDURE — 99214 OFFICE O/P EST MOD 30 MIN: CPT

## 2024-06-23 ENCOUNTER — APPOINTMENT (OUTPATIENT)
Dept: MRI IMAGING | Facility: CLINIC | Age: 59
End: 2024-06-23
Payer: COMMERCIAL

## 2024-06-23 PROCEDURE — 72148 MRI LUMBAR SPINE W/O DYE: CPT

## 2024-06-25 NOTE — HISTORY OF PRESENT ILLNESS
[Lower back] : lower back [de-identified] : 57 yo M presenting with L buttock and L thigh pain x 6/4 without injury. Hx of lumbar interbody fusion L3-S1 2006 and hx of L SCOT 2/2023 with Dr Olivier. Tried ibuprofen, heat, ice, without relief. Pain with walking and going up and down stairs. No symptoms on the R. NO weakness, no bb incontinence. Saw Dr Olivier who advised his hip was fine and its likely his back.  [FreeTextEntry5] : 57 yo M presenting with low back pain that started 2 weeks ago. Had L Spine fusion in 2006. Radiates into left hip. Referred by [facility].

## 2024-06-25 NOTE — IMAGING
[Disc space narrowing] : Disc space narrowing [No loosening of hardware] : No loosening of hardware [Fusion intact] : Fusion intact [de-identified] : healed midline incision no lumbar paraspinal muscle tenderness no lumbar paraspinal muscle spasm   no pain with flexion no pain with extension diminished ROM in all planes d/t stiffness   motor exam 5/5 strength bilaterally sensory intact negative SLR no discernable dermatome of pain   ambulates without assistive device non antalgic gait able to heel/toe walk      [FreeTextEntry1] : loss of disc height at L2-3 above prior fusion [de-identified] : L SCOT intact

## 2024-06-25 NOTE — ASSESSMENT
[FreeTextEntry1] : 59 yo M with hx of L3-S1 fusion 2006 and L SCOT 2023 with L buttock and L thigh pain. XRs shows SCOT and lumbar fusion intact/stable. Some loss of disc height at adjacent level above at L2-3. Will get MRI LS to evaluate if there is neural compression acct for the leg symptoms. f/u after MRI to review.

## 2024-07-02 ENCOUNTER — APPOINTMENT (OUTPATIENT)
Dept: ORTHOPEDIC SURGERY | Facility: CLINIC | Age: 59
End: 2024-07-02
Payer: COMMERCIAL

## 2024-07-02 DIAGNOSIS — M54.50 LOW BACK PAIN, UNSPECIFIED: ICD-10-CM

## 2024-07-02 DIAGNOSIS — Z98.1 ARTHRODESIS STATUS: ICD-10-CM

## 2024-07-02 DIAGNOSIS — M51.26 OTHER INTERVERTEBRAL DISC DISPLACEMENT, LUMBAR REGION: ICD-10-CM

## 2024-07-02 PROCEDURE — 99214 OFFICE O/P EST MOD 30 MIN: CPT | Mod: 25

## 2024-07-02 PROCEDURE — 20553 NJX 1/MLT TRIGGER POINTS 3/>: CPT

## 2024-07-03 RX ORDER — OXYCODONE AND ACETAMINOPHEN 10; 325 MG/1; MG/1
10-325 TABLET ORAL
Qty: 30 | Refills: 0 | Status: ACTIVE | COMMUNITY
Start: 2024-07-03 | End: 1900-01-01

## 2024-07-09 ENCOUNTER — APPOINTMENT (OUTPATIENT)
Dept: ORTHOPEDIC SURGERY | Facility: CLINIC | Age: 59
End: 2024-07-09

## 2024-07-24 ENCOUNTER — APPOINTMENT (OUTPATIENT)
Dept: PAIN MANAGEMENT | Facility: CLINIC | Age: 59
End: 2024-07-24
Payer: COMMERCIAL

## 2024-07-24 VITALS — HEIGHT: 65 IN | BODY MASS INDEX: 27.66 KG/M2 | WEIGHT: 166 LBS

## 2024-07-24 DIAGNOSIS — M48.061 SPINAL STENOSIS, LUMBAR REGION WITHOUT NEUROGENIC CLAUDICATION: ICD-10-CM

## 2024-07-24 PROCEDURE — 99214 OFFICE O/P EST MOD 30 MIN: CPT

## 2024-07-24 NOTE — ASSESSMENT
[FreeTextEntry1] : After discussing various treatment options with the patient including but not limited to oral medications, physical therapy, exercise, modalities as well as interventional spinal injections, we have decided with the following plan:  1) Intervention Injection Therapy: I personally reviewed the MRI/CT scan images and agree with the radiologist's report. The radiological findings were discussed with the patient. The risks, benefits, contents and alternatives to injection were explained in full to the patient. Risks outlined include but are not limited to infection,sepsis, bleeding, post-dural puncture headache, nerve damage, temporary increase in pain, syncopal episode, failure to resolve symptoms, allergic reaction, symptom recurrence, and elevation of blood sugar in diabetics. Cortisone may cause immunosuppression. Patient understands the risks. All questions were answered. After discussion of options, patient requested an injection. Information regarding the injection was given to the patient. Which medications to stop prior to the injection was explained to the patient as well.  Follow up in 1-2 weeks post injection for re-evaluation.  Continue Home exercises, stretching, activity modification, physical therapy, and conservative care. Patient is presenting with acute/sub-acute radicular pain with impairment in ADLs and functionality.  The pain has not responded sufficiently to  conservative care including nsaid therapy and/or physical therapy.  There is no bleeding tendency, unstable medical condition, or systemic infection. The purpose of the spinal injections is to facilitate active therapy by providing short term relief through reduction of pain and inflammation.   Injections, by themselves, are not likely to provide long-term relief. Rather, active rehabilitation with modified work achieves long-term relief by increasing active ROM, strength and stability.   Caudal LEROY

## 2024-07-24 NOTE — PHYSICAL EXAM
[Left] : left hip [de-identified] : external rotation 20 degrees [TWNoteComboBox6] : internal rotation 20 degrees [] : lumbar paraspinal spasm [Extension] : extension [4___] : left hip flexion 4[unfilled]/5 [TWNoteComboBox7] : forward flexion 60 degrees [de-identified] : extension 0 degrees

## 2024-07-24 NOTE — PHYSICAL EXAM
[Left] : left hip [de-identified] : external rotation 20 degrees [TWNoteComboBox6] : internal rotation 20 degrees [] : lumbar paraspinal spasm [Extension] : extension [4___] : left hip flexion 4[unfilled]/5 [TWNoteComboBox7] : forward flexion 60 degrees [de-identified] : extension 0 degrees

## 2024-07-24 NOTE — HISTORY OF PRESENT ILLNESS
[Work related] : work related [9] : 9 [6] : 6 [Dull/Aching] : dull/aching [Sharp] : sharp [Constant] : constant [Household chores] : household chores [Leisure] : leisure [Work] : work [Sleep] : sleep [Rest] : rest [Meds] : meds [Standing] : standing [Lower back] : lower back [4] : 4 [Stabbing] : stabbing [Walking] : walking [FreeTextEntry1] : 7/24/24: Evaluation today for left lower back pain. Pain started in the left buttock.  Had left SCOT by Dr. Olivier in 2022.  MRI lumbar spine: 6/23/24: multi level DDD with post op fusion with wide laminectomies. (surgery was in 2009)Dr. Simpson.  Disc herniations with stenosis on the right at L1/2. impingement upon the exiting L5 nerve roots at L5/S1.  FUSED from L3-L5. No radicular pain. no numbness or tingling. Currently in PT.   11/21/22- Here for fu for left hip injection on 10/4/22.  Not much relief from left hip injection.  Still has pain in left groin all the time.  Will get MRI to evaluate for a tear.  He failed at least 6 weeks of PT and intra-articular hip injection.    9/7/22: Evaluation today for left hip pain. Patient had a work injury on 8/2/22 when he was getting out of a car and slipping, injuring his let hip. Pain is the left groin started immediately following.  He has not yet had any PT> He is taking Aleve PRN.   he took 2 medrol dose packs. took the edge off. No radicular pain. no n/t.    [] : no [FreeTextEntry3] : 8/2/22

## 2024-07-25 ENCOUNTER — APPOINTMENT (OUTPATIENT)
Dept: ORTHOPEDIC SURGERY | Facility: AMBULATORY SURGERY CENTER | Age: 59
End: 2024-07-25

## 2024-07-25 PROCEDURE — 62323 NJX INTERLAMINAR LMBR/SAC: CPT

## 2024-08-08 ENCOUNTER — APPOINTMENT (OUTPATIENT)
Dept: PAIN MANAGEMENT | Facility: CLINIC | Age: 59
End: 2024-08-08

## 2024-08-13 ENCOUNTER — APPOINTMENT (OUTPATIENT)
Dept: ORTHOPEDIC SURGERY | Facility: CLINIC | Age: 59
End: 2024-08-13
Payer: COMMERCIAL

## 2024-08-13 DIAGNOSIS — Z98.1 ARTHRODESIS STATUS: ICD-10-CM

## 2024-08-13 DIAGNOSIS — M48.061 SPINAL STENOSIS, LUMBAR REGION WITHOUT NEUROGENIC CLAUDICATION: ICD-10-CM

## 2024-08-13 DIAGNOSIS — M54.50 LOW BACK PAIN, UNSPECIFIED: ICD-10-CM

## 2024-08-13 PROCEDURE — 99212 OFFICE O/P EST SF 10 MIN: CPT

## 2024-08-13 NOTE — IMAGING
[Disc space narrowing] : Disc space narrowing [No loosening of hardware] : No loosening of hardware [Fusion intact] : Fusion intact [FreeTextEntry1] : loss of disc height at L2-3 above prior fusion [de-identified] : L SCOT intact [de-identified] : healed midline incision no lumbar paraspinal muscle tenderness no lumbar paraspinal muscle spasm   no pain with flexion no pain with extension full ROM with stiffness   motor exam 5/5 strength bilaterally sensory intact negative SLR no discernable dermatome of pain   ambulates without assistive device non antalgic gait able to heel/toe walk

## 2024-08-13 NOTE — DATA REVIEWED
[FreeTextEntry1] : [facility] Spine MRI 6/23/24 1. Postop fusion L3-5  2. L2-3 left foraminal HNP with moderate-severe foraminal stenosis.

## 2024-08-13 NOTE — ASSESSMENT
Patient understands condition, prognosis and need for follow up care. [FreeTextEntry1] : 59 yo M with hx of L3-S1 fusion 2006 and L SCOT 2023 with left sided low back pain intermittently into L buttocks. Good relief following LEROY with Dr. Matias of 7/25/24  - Continue HEP. - RTW on 8/20/24. - Follow up prn.

## 2024-08-13 NOTE — HISTORY OF PRESENT ILLNESS
[Lower back] : lower back [de-identified] : 08/13/2024: Follow up low back. He notes good relief following LEROY with Dr. Matias. Notes occasional soreness.  07/02/24 :CINTIA WILHELM , a58 year old male, presents today for MRI review of L spine, pt states as of lately pain flared up, pain is constant , no changes from last visit. pain is at a 10 today. states numbness / tingling across the whole L spine. Pain alleviates when lying down. Mainly focused on left side, intermittently radiates into buttocks.   59 yo M presenting with L buttock and L thigh pain x 6/4 without injury. Hx of lumbar interbody fusion L3-S1 2006 and hx of L SCOT 2/2023 with Dr Olivier. Tried ibuprofen, heat, ice, without relief. Pain with walking and going up and down stairs. No symptoms on the R. NO weakness, no bb incontinence. Saw Dr Olivier who advised his hip was fine and its likely his back.  [FreeTextEntry5] : Follow Up L Spine. Doing much better since last visit.

## 2024-09-10 ENCOUNTER — APPOINTMENT (OUTPATIENT)
Dept: ORTHOPEDIC SURGERY | Facility: CLINIC | Age: 59
End: 2024-09-10

## 2025-02-14 NOTE — DISCHARGE NOTE NURSING/CASE MANAGEMENT/SOCIAL WORK - WILL THE PATIENT ACCEPT THE PFIZER COVID-19 VACCINE IF ELIGIBLE AND IT IS AVAILABLE?
PAIN MANAGEMENT IN ADULTS    What is pain? Pain is your body’s way to reacting to injury or illness. Everybody reacts to pain in different ways. What you think is painful may not be painful to someone else. But, pain is whatever you say it is!  What causes pain? Many things, such as an injury, surgery, or a disease can cause pain. Some pain is caused by pressure one a nerve, such as a cancerous tumor or slipped disc. Other pain is caused when nerves are cut as in an accident or surgery. After an injury or surgery you may not want to move the painful part of our body at all. But, you may have pain because you are not moving this body part. Sometimes there is no clear reason for your pain.    What are the different types of pain?  Acute pain is short?lived and lasts less than 3 months. Caregivers help first work to remove the cause of the pain, such as fixing a broken arm. Acute pain usually can be controlled or stopped with pain medicine.  Chronic pain lasts longer than 3?6 months. This kind of pain is often more complicated. Caregivers may use medicine along with other treatments, like self?hypnosis and relaxation to help you learn to deal with the chronic pain.  What is your pain like? Caregivers want you to talk to them about your pain. This helps them learn what may be causing the pain and how best to treat it.  Why is pain control important? Pain can affect your appetite, how well you sleep, your energy and your ability to do things. Pain can also affect your mood and relationship with others. If caregivers can help you control your pain, you will suffer less and can even heal faster.  How can pain medicine be given? Following are the many different ways pain medicine can be given depending on the kind of pain you are experiencing.  By mouth: you may be given pills or liquid to swallow or you may be given a pill or liquid to put under your tongue. Some medicine can also be given as a lozenge like a cough drop or  even a special lollipop.  Rectal: medicine in a suppository is put into your rectum.  Shot/Injection: pain medicine can be given as a shot/injection into an IV, into a muscle or under the skin  Topical: medicine in a cream or gel is spread over the skin.  Transdermal: medicine given in a patch and put onto the skin. This medicine is released slowly to give pain relief for as long as 72 hours.    How can you take pain medicine safely and make it work best for you? The following are many different ways pain medicine can be given depending on the kind of pain you have.  Some pain medicines can make you breathe less deeply and less often. The medicine may also make you sleepy, dizzy, and unsafe to drive a car or use heavy equipment. For these reasons, it is very important to follow your caregiver’s advice on how to use this medicine.  Sometimes the pain is worse when you first wake up in the morning. This may happened if you did not have enough pain medicine in your blood stream to last through the night. Caregivers may tell you to take a dose of pain medicine during the night.  Some foods, alcohol, and other medicines may cause unpleasant side effects when you take pain medicine. Follow your caregiver’s advice about how to prevent these problems.  Pain medicine can make you constipated. Straining with a BM can make you pain worse. Do not try to push the BM out if it’s too hard. Contact your caregiver if you become constipated.  Other non?drug pain control methods. There are many pain control techniques that can held you deal with pain even if it does not go away completely. It is important to practice some of the techniques when you don’t have pain if possible. This will help the technique work better during an attack of pain.  Cold and heat: both cold and heat can help lessen some types of post?op pain. Caregivers will tell you if cold and/or hot packs will help your pain.  Distraction: teaches you to focus your  attention on something other than pain. Playing cards or games, talking and visiting family may relax you and keep you from thinking about pain.  Hydrotherapy: is a gentle water exercise program. It can strengthen muscles that are not injured and lessen inflammation. Talk to your physical therapist or your caregiver about starting a hydrotherapy program.  Massage  Music  Physical therapy  Rest  Surgery/Nerve blocks  Call your caregiver if you have any of the following problems:  The pain medicine you are taking makes you sleepier than usual or confused  You have new pain or the pain seems different than before  You have constipation  Care agreement: You have the right to help plan your care. To help with this plan you must learn about your pain, what is causing it, and how it can be treated. You can then discuss treatment options with your caregivers. Work with them to decide what care will be used to treat you. You always have the right to refuse treatment.    No
